# Patient Record
Sex: MALE | Race: WHITE | Employment: UNEMPLOYED | URBAN - METROPOLITAN AREA
[De-identification: names, ages, dates, MRNs, and addresses within clinical notes are randomized per-mention and may not be internally consistent; named-entity substitution may affect disease eponyms.]

---

## 2017-04-11 ENCOUNTER — OFFICE VISIT (OUTPATIENT)
Dept: NEUROLOGY | Age: 67
End: 2017-04-11

## 2017-04-11 VITALS
HEART RATE: 74 BPM | BODY MASS INDEX: 27.05 KG/M2 | HEIGHT: 62 IN | DIASTOLIC BLOOD PRESSURE: 80 MMHG | WEIGHT: 147 LBS | SYSTOLIC BLOOD PRESSURE: 139 MMHG

## 2017-04-11 DIAGNOSIS — G40.209 PARTIAL EPILEPSY WITH IMPAIRMENT OF CONSCIOUSNESS (HCC): ICD-10-CM

## 2017-04-11 PROCEDURE — 99213 OFFICE O/P EST LOW 20 MIN: CPT | Performed by: PSYCHIATRY & NEUROLOGY

## 2017-04-11 RX ORDER — PRIMIDONE 250 MG/1
TABLET ORAL
Qty: 60 TABLET | Refills: 5 | Status: SHIPPED | OUTPATIENT
Start: 2017-04-11 | End: 2017-10-03 | Stop reason: SDUPTHER

## 2017-10-03 ENCOUNTER — OFFICE VISIT (OUTPATIENT)
Dept: NEUROLOGY | Age: 67
End: 2017-10-03

## 2017-10-03 VITALS
DIASTOLIC BLOOD PRESSURE: 68 MMHG | HEIGHT: 62 IN | BODY MASS INDEX: 27.79 KG/M2 | HEART RATE: 62 BPM | SYSTOLIC BLOOD PRESSURE: 131 MMHG | WEIGHT: 151 LBS

## 2017-10-03 DIAGNOSIS — G40.209 PARTIAL EPILEPSY WITH IMPAIRMENT OF CONSCIOUSNESS (HCC): Primary | ICD-10-CM

## 2017-10-03 DIAGNOSIS — G47.33 OBSTRUCTIVE SLEEP APNEA: ICD-10-CM

## 2017-10-03 PROCEDURE — 99214 OFFICE O/P EST MOD 30 MIN: CPT | Performed by: PSYCHIATRY & NEUROLOGY

## 2017-10-03 RX ORDER — PRIMIDONE 250 MG/1
TABLET ORAL
Qty: 60 TABLET | Refills: 5 | Status: SHIPPED | OUTPATIENT
Start: 2017-10-03 | End: 2018-03-28 | Stop reason: SDUPTHER

## 2017-10-03 RX ORDER — CLOTRIMAZOLE AND BETAMETHASONE DIPROPIONATE 10; .64 MG/G; MG/G
CREAM TOPICAL
Refills: 4 | COMMUNITY
Start: 2017-08-07

## 2017-10-03 RX ORDER — FLUTICASONE PROPIONATE 50 MCG
SPRAY, SUSPENSION (ML) NASAL
Refills: 3 | COMMUNITY
Start: 2017-08-25

## 2017-10-03 RX ORDER — CLOPIDOGREL BISULFATE 75 MG/1
TABLET ORAL
Refills: 5 | COMMUNITY
Start: 2017-09-24

## 2017-10-03 RX ORDER — LEVOTHYROXINE SODIUM 0.05 MG/1
TABLET ORAL
Refills: 5 | COMMUNITY
Start: 2017-09-05 | End: 2017-10-03 | Stop reason: CLARIF

## 2017-10-03 NOTE — MR AVS SNAPSHOT
people who are more muscular. If your body fat is high you can improve your BMI by decreasing your calorie intake and becoming more physically active. Learn more at: [employer]          Instructions    Please call with any questions or concerns:   [facility] Neurology  @ [phone]. LAB RESULTS:  Please obtain any labs or diagnostic tests as discussed today. You may call the office to check the results. Please allow  3 to 7 days for us to get these results. MEDICATION LIST:  Please bring an accurate list of your medications to every visit. APPOINTMENT CONFIRMATION:  We will call you the day before your scheduled appointment to confirm. If we are unable to reach you, you MUST call back by the end of the day to confirm the appointment or we may be forced to cancel. Where to Get Your Medications      These medications were sent to [address], [address] [address]    Hours:  24-hours Phone:  [phone]     primidone 250 MG tablet         Your Current Medications Are              clopidogrel (PLAVIX) 75 MG tablet TK 1 T PO QD    fluticasone (FLONASE) 50 MCG/ACT nasal spray INSTILL 1 SPRAY IEN BID    clotrimazole-betamethasone (LOTRISONE) 1-0.05 % cream TE EXT AA BID    primidone (MYSOLINE) 250 MG tablet TAKE 1 TABLET BY MOUTH TWICE DAILY    atorvastatin (LIPITOR) 40 MG tablet     aspirin 81 MG tablet Take 81 mg by mouth daily    Simvastatin (ZOCOR PO) Take  by mouth. LEVOTHYROXINE SODIUM by Does not apply route. Omega-3-acid Ethyl Esters (LOVAZA PO) Take  by mouth.       Allergies           No Known Allergies      We Ordered/Performed the following           Viridiana Alcazarv     Scheduling Instructions:    [address]  3rd floor  [place], [address] [facility]  phone [phone] fax [phone]

## 2017-10-03 NOTE — PROGRESS NOTES
Boby Fritz   Neurology followup    Subjective:   CC/HP  History was obtained from patient  Patient has a chronic history for seizure disorder. Patient has not had a seizure in a number of years. No side effects of medication  New symptom:   Patient complains of significant daytime sleepiness   He feels tired all the time   Many times he does not feel rested in the morning   He has been told by family members that he snores at night   I'm concerned about possible obstructive sleep apnea syndrome   No other new neurological symptoms      REVIEW OF SYSTEMS    Constitutional:  []   Chills   []  Fatigue   []  Fevers   []  Malaise   []  Weight loss     [x] Denies all of the above    Respiratory:   []  Cough    []  Shortness of breath         [x] Denies all of the above     Cardiovascular:   []  Chest pain    []  Exertional chest pressure/discomfort           [] Palpitations    []  Syncope     [x] Denies all of the above        Past Medical History:   Diagnosis Date    Hyperlipidemia     Hypothyroidism     Seizures ([facility] [address]      History reviewed. No pertinent family history. Social History     Social History    Marital status: Single     Spouse name: N/A    Number of children: N/A    Years of education: N/A     Social History Main Topics    Smoking status: Never Smoker    Smokeless tobacco: Never Used    Alcohol use No    Drug use: No    Sexual activity: Not Asked     Other Topics Concern    None     Social History Narrative        Objective:  Exam:  /68  Pulse 62  Ht 5' 2\" (1.575 m)  Wt 151 lb (68.5 kg)  BMI 27.62 kg/m2  This is a well-nourished patient in no acute distress  Patient is awake, alert and oriented x3. Speech is normal.  Pupils are equal round reacting to light. Extraocular movements intact. Face symmetrical. Tongue midline. Motor exam shows normal symmetrical strength. Deep tendon reflexes normal. Plantar reflexes downgoing.   Sensory exam normal. Coordination normal. Gait normal. No carotid bruit. No neck stiffness. Impression :  Partial complex seizures, stable  Patient does not want to stop medication even though he has not had a seizure in a number of years  Probable obstructive sleep apnea syndrome but does snoring increased sleepiness and generalized fatigue    Plan :  Continue primidone 250 mg, one tablet b.i.d.  I will refer the patient for a sleep study              Please note a portion of  this chart was generated using dragon dictation software. Although every effort was made to ensure the accuracy of this automated transcription, some errors in transcription may have occurred.

## 2017-12-05 ENCOUNTER — INITIAL CONSULT (OUTPATIENT)
Dept: SLEEP MEDICINE | Age: 67
End: 2017-12-05

## 2017-12-05 VITALS
BODY MASS INDEX: 29.27 KG/M2 | HEIGHT: 61 IN | SYSTOLIC BLOOD PRESSURE: 154 MMHG | HEART RATE: 52 BPM | WEIGHT: 155 LBS | OXYGEN SATURATION: 100 % | DIASTOLIC BLOOD PRESSURE: 68 MMHG

## 2017-12-05 DIAGNOSIS — G40.209 PARTIAL EPILEPSY WITH IMPAIRMENT OF CONSCIOUSNESS (HCC): Chronic | ICD-10-CM

## 2017-12-05 DIAGNOSIS — E03.9 ACQUIRED HYPOTHYROIDISM: Chronic | ICD-10-CM

## 2017-12-05 DIAGNOSIS — R06.83 SNORING: ICD-10-CM

## 2017-12-05 DIAGNOSIS — G47.10 HYPERSOMNIA: Primary | ICD-10-CM

## 2017-12-05 PROCEDURE — 99204 OFFICE O/P NEW MOD 45 MIN: CPT | Performed by: INTERNAL MEDICINE

## 2017-12-05 ASSESSMENT — ENCOUNTER SYMPTOMS
EYE PAIN: 0
ABDOMINAL DISTENTION: 0
CHOKING: 0
SHORTNESS OF BREATH: 0
CHEST TIGHTNESS: 0
PHOTOPHOBIA: 0
NAUSEA: 0
RHINORRHEA: 0
ABDOMINAL PAIN: 0
ALLERGIC/IMMUNOLOGIC NEGATIVE: 1
VOMITING: 0
APNEA: 0

## 2017-12-05 ASSESSMENT — SLEEP AND FATIGUE QUESTIONNAIRES
NECK CIRCUMFERENCE (INCHES): 17
HOW LIKELY ARE YOU TO NOD OFF OR FALL ASLEEP WHILE LYING DOWN TO REST IN THE AFTERNOON WHEN CIRCUMSTANCES PERMIT: 1
HOW LIKELY ARE YOU TO NOD OFF OR FALL ASLEEP WHILE WATCHING TV: 0
HOW LIKELY ARE YOU TO NOD OFF OR FALL ASLEEP WHILE SITTING AND TALKING TO SOMEONE: 0
HOW LIKELY ARE YOU TO NOD OFF OR FALL ASLEEP IN A CAR, WHILE STOPPED FOR A FEW MINUTES IN TRAFFIC: 0
HOW LIKELY ARE YOU TO NOD OFF OR FALL ASLEEP WHEN YOU ARE A PASSENGER IN A CAR FOR AN HOUR WITHOUT A BREAK: 0
HOW LIKELY ARE YOU TO NOD OFF OR FALL ASLEEP WHILE SITTING QUIETLY AFTER LUNCH WITHOUT ALCOHOL: 0
HOW LIKELY ARE YOU TO NOD OFF OR FALL ASLEEP WHILE SITTING AND READING: 0
HOW LIKELY ARE YOU TO NOD OFF OR FALL ASLEEP WHILE SITTING INACTIVE IN A PUBLIC PLACE: 0
ESS TOTAL SCORE: 1

## 2017-12-05 NOTE — LETTER
[facility]  [address] [address]  Phone: [phone]  Fax: [phone]      December 5, 2017       Patient: Boby Fritz   MR Number: [id number]   YOB: 1950   Date of Visit: 12/5/2017     Thank you for allowing me to participate in the care of Boby Fritz. Here is my assessment and plan. Also attached is a copy of his consult note:    ASSESSMENT:  Yusra Cazares was seen today for sleep apnea. Diagnoses and all orders for this visit:    Hypersomnia  Comments:  New problem, needs w/u  Orders:  -     Baseline Diagnostic Sleep Study; Future    Snoring  Comments:  New problem, needs w/u  Orders:  -     Baseline Diagnostic Sleep Study; Future    Partial epilepsy with impairment of consciousness ([facility] [address]    Acquired hypothyroidism        Plan:     Differential diagnosis includes but not limited to: YANICK, PLMD's, narcolepsy, parasomnias. Reviewed YANICK (which is the highest likelihood diagnosis): pathophysiology, diagnosis, complications and treatment. Instructed him not to drive if drowsy. Continue medications per his PCP and other physicians. Limit caffeine use after 3pm. Will do PSG to rule-out YANICK and other sleep disorders. 1 wk follow up after study to review his results. The chronic medical conditions listed are directly related to the primary diagnosis listed above. The management of the primary diagnosis affects the secondary diagnosis and vice versa. Cont meds for SZ and hypothyroid. Given SZ pt is not a candidate for HST. Orders Placed This Encounter   Procedures    Baseline Diagnostic Sleep Study         If you have questions or concerns, please do not hesitate to call me. I look forward to following Yusra Cazares along with you.     Sincerely,      Fredo Mcbride MD    CC providers:  Mis Gonzalez MD  [address] [place] [address], [address] [place] [address] Shlomo Calderon 36 In MD Aman  [address] [place] [id number] VIA Facsimile: [phone]

## 2017-12-05 NOTE — PROGRESS NOTES
Sathish Jean Baptiste MD, FAA, [facility] HEART AND SURGICAL [facility]  [address] [facility]  3rd Floor, [address] [facility], [address] [phone]   [address] [place] [address] Ninfa Flanagan. [address] [phone]     [address] [place] [address] SLEEP MEDICINE    Subjective:     Patient ID: Natasha Vaughan is a 75 y.o. male. Chief Complaint   Patient presents with    Sleep Apnea       HPI:        Natasha Vaughan is a 75 y.o. male referred by Dr. Thais Bishop for a sleep evaluation. He complains of snoring, tossing and turning, decreased concentration, excessive daytime sleepiness, difficulty falling asleep once awakened, feels sleepy during the day but he denies knees buckling with laughing, completely or partially paralyzed while falling asleep or waking up. Symptoms began 5 years ago, gradually worsening since that time. Previous evaluation and treatment has included- none. DOT/CDL - No  FAA/'s license - No      Previous Report(s) Reviewed: historical medical records, office notes and referral letter/letters     [place] - Total score: 1    Caffeine Intake - 2 cans/bottles of caffeinated soda pop per day(s)    Social History     Social History    Marital status: Single     Spouse name: N/A    Number of children: N/A    Years of education: N/A     Occupational History    Not on file. Social History Main Topics    Smoking status: Never Smoker    Smokeless tobacco: Never Used    Alcohol use No    Drug use: No    Sexual activity: Not on file     Other Topics Concern    Not on file     Social History Narrative    No narrative on file       Prior to Admission medications    Medication Sig Start Date End Date Taking?  Authorizing Provider   Multiple Vitamin (MULTI-VITAMIN DAILY PO) Take by mouth   Yes Historical Provider, MD   clopidogrel (PLAVIX) 75 MG tablet TK 1 T PO QD 9/24/17  Yes Historical Provider, MD   fluticasone (FLONASE) 50 MCG/ACT nasal spray INSTILL 1 SPRAY IEN BID 8/25/17  Yes Historical Provider, MD   clotrimazole-betamethasone (Diandra Oddi) 1-0.05 % cream TE EXT AA BID 8/7/17  Yes Historical Provider, MD   primidone (MYSOLINE) 250 MG tablet TAKE 1 TABLET BY MOUTH TWICE DAILY 10/3/17  Yes Maria Elena Arriaga MD   atorvastatin (LIPITOR) 40 MG tablet  10/26/15  Yes Historical Provider, MD   aspirin 81 MG tablet Take 81 mg by mouth daily   Yes Historical Provider, MD   LEVOTHYROXINE SODIUM by Does not apply route. Yes Historical Provider, MD   Omega-3-acid Ethyl Esters (LOVAZA PO) Take  by mouth. Yes Historical Provider, MD   Simvastatin (ZOCOR PO) Take  by mouth. Historical Provider, MD       Allergies as of 12/05/2017    (No Known Allergies)       Patient Active Problem List   Diagnosis    Partial epilepsy with impairment of consciousness ([facility] [address]    Acquired hypothyroidism       Past Medical History:   Diagnosis Date    Hyperlipidemia     Hypothyroidism     Seizures ([facility] [address]        History reviewed. No pertinent surgical history. History reviewed. No pertinent family history. Review of Systems   Constitutional: Positive for fatigue. Negative for activity change and appetite change. HENT: Positive for congestion. Negative for nosebleeds, postnasal drip, rhinorrhea and sneezing. Eyes: Negative for photophobia, pain and visual disturbance. Respiratory: Negative for apnea, choking, chest tightness and shortness of breath. Cardiovascular: Negative. Gastrointestinal: Negative for abdominal distention, abdominal pain, nausea and vomiting. Endocrine: Negative for cold intolerance and heat intolerance. Genitourinary: Negative for difficulty urinating, dysuria, frequency and urgency. Musculoskeletal: Negative. Negative for neck pain and neck stiffness. Skin: Negative. Allergic/Immunologic: Negative. Neurological: Positive for seizures. Negative for tremors, syncope and weakness. Hematological: Negative for adenopathy.  Does not side): No submental, no submandibular and no tonsillar adenopathy present. Head (left side): No submental, no submandibular and no tonsillar adenopathy present. Neurological: He is alert and oriented to person, place, and time. Skin: Skin is warm, dry and intact. No cyanosis. Nails show no clubbing. Psychiatric: He has a normal mood and affect. His speech is normal and behavior is normal. Thought content normal.   Nursing note and vitals reviewed. Assessment:      1. Hypersomnia  Baseline Diagnostic Sleep Study    New problem, needs w/u   2. Snoring  Baseline Diagnostic Sleep Study    New problem, needs w/u   3. Partial epilepsy with impairment of consciousness (HCC) Stable    4. Acquired hypothyroidism Stable         Plan:      Differential diagnosis includes but not limited to: YANICK, PLMD's, narcolepsy, parasomnias. Reviewed YANICK (which is the highest likelihood diagnosis): pathophysiology, diagnosis, complications and treatment. Instructed him not to drive if drowsy. Continue medications per his PCP and other physicians. Limit caffeine use after 3pm. Will do PSG to rule-out YANICK and other sleep disorders. 1 wk follow up after study to review his results. The chronic medical conditions listed are directly related to the primary diagnosis listed above. The management of the primary diagnosis affects the secondary diagnosis and vice versa. Cont meds for SZ and hypothyroid. Given SZ pt is not a candidate for HST.     Orders Placed This Encounter   Procedures    Baseline Diagnostic Sleep Study          Aggie Hargrove MD, Candice Bermudez, [address] [facility] and [address] [facility]

## 2018-01-21 ENCOUNTER — HOSPITAL ENCOUNTER (OUTPATIENT)
Dept: SLEEP MEDICINE | Age: 68
Discharge: HOME | End: 2018-01-23
Attending: INTERNAL MEDICINE | Admitting: INTERNAL MEDICINE

## 2018-01-21 DIAGNOSIS — G47.10 HYPERSOMNIA: ICD-10-CM

## 2018-01-21 DIAGNOSIS — R06.83 SNORING: ICD-10-CM

## 2018-01-21 PROCEDURE — 95810 POLYSOM 6/> YRS 4/> PARAM: CPT | Performed by: INTERNAL MEDICINE

## 2018-01-29 ENCOUNTER — TELEPHONE (OUTPATIENT)
Dept: SLEEP MEDICINE | Age: 68
End: 2018-01-29

## 2018-01-29 NOTE — TELEPHONE ENCOUNTER
Spoke with pt to review PSG. Pt appeared to understand. Eveline Pastor from Sleep lab to call to  schedule study 01/30/2017. Pt does not want a Sunday night study due to Yazdanism and laundry day on Monday. Pt to be called with results from titration.

## 2018-03-19 DIAGNOSIS — G47.33 OBSTRUCTIVE SLEEP APNEA SYNDROME: Primary | ICD-10-CM

## 2018-03-20 ENCOUNTER — HOSPITAL ENCOUNTER (OUTPATIENT)
Dept: SLEEP MEDICINE | Age: 68
Discharge: HOME | End: 2018-03-22
Attending: INTERNAL MEDICINE | Admitting: INTERNAL MEDICINE

## 2018-03-20 DIAGNOSIS — G47.33 OBSTRUCTIVE SLEEP APNEA SYNDROME: ICD-10-CM

## 2018-03-20 PROCEDURE — 95811 POLYSOM 6/>YRS CPAP 4/> PARM: CPT | Performed by: INTERNAL MEDICINE

## 2018-03-26 ENCOUNTER — TELEPHONE (OUTPATIENT)
Dept: SLEEP MEDICINE | Age: 68
End: 2018-03-26

## 2018-03-26 NOTE — TELEPHONE ENCOUNTER
Tried to call pt to advise him that I faxed his order for CPAP to [facility] today, but got a busy signal. Pt needs a 8-10 weeks cnfu scheduled

## 2018-03-28 ENCOUNTER — OFFICE VISIT (OUTPATIENT)
Dept: NEUROLOGY | Age: 68
End: 2018-03-28

## 2018-03-28 ENCOUNTER — TELEPHONE (OUTPATIENT)
Dept: SLEEP MEDICINE | Age: 68
End: 2018-03-28

## 2018-03-28 VITALS
HEART RATE: 68 BPM | WEIGHT: 158 LBS | SYSTOLIC BLOOD PRESSURE: 135 MMHG | BODY MASS INDEX: 29.08 KG/M2 | DIASTOLIC BLOOD PRESSURE: 74 MMHG | HEIGHT: 62 IN

## 2018-03-28 DIAGNOSIS — G40.209 PARTIAL EPILEPSY WITH IMPAIRMENT OF CONSCIOUSNESS (HCC): Primary | ICD-10-CM

## 2018-03-28 DIAGNOSIS — G47.33 OBSTRUCTIVE SLEEP APNEA: ICD-10-CM

## 2018-03-28 PROCEDURE — 99213 OFFICE O/P EST LOW 20 MIN: CPT | Performed by: PSYCHIATRY & NEUROLOGY

## 2018-03-28 RX ORDER — PRIMIDONE 250 MG/1
TABLET ORAL
Qty: 60 TABLET | Refills: 5 | Status: SHIPPED | OUTPATIENT
Start: 2018-03-28 | End: 2018-09-25 | Stop reason: SDUPTHER

## 2018-03-28 NOTE — PROGRESS NOTES
carotid bruit. No neck stiffness. Impression :  Partial complex seizures, stable  Patient does not want to stop medication even though he has not had a seizure in a number of years  Obstructive sleep apnea syndrome, patient needs to start using CPAP    Plan :  Continue primidone 250 mg, one tablet b.i.d. Patient will check with the sleep clinic to get his CPAP machine              Please note a portion of  this chart was generated using dragon dictation software. Although every effort was made to ensure the accuracy of this automated transcription, some errors in transcription may have occurred.

## 2018-04-02 ENCOUNTER — TELEPHONE (OUTPATIENT)
Dept: SLEEP MEDICINE | Age: 68
End: 2018-04-02

## 2018-09-25 ENCOUNTER — OFFICE VISIT (OUTPATIENT)
Dept: NEUROLOGY | Age: 68
End: 2018-09-25
Payer: MEDICARE

## 2018-09-25 VITALS
DIASTOLIC BLOOD PRESSURE: 70 MMHG | HEIGHT: 62 IN | HEART RATE: 68 BPM | WEIGHT: 163 LBS | SYSTOLIC BLOOD PRESSURE: 134 MMHG | BODY MASS INDEX: 30 KG/M2

## 2018-09-25 DIAGNOSIS — G40.209 PARTIAL EPILEPSY WITH IMPAIRMENT OF CONSCIOUSNESS (HCC): ICD-10-CM

## 2018-09-25 PROCEDURE — 99213 OFFICE O/P EST LOW 20 MIN: CPT | Performed by: PSYCHIATRY & NEUROLOGY

## 2018-09-25 RX ORDER — CYCLOBENZAPRINE HCL 5 MG
5 TABLET ORAL 3 TIMES DAILY PRN
COMMUNITY

## 2018-09-25 RX ORDER — PRIMIDONE 250 MG/1
TABLET ORAL
Qty: 60 TABLET | Refills: 5 | Status: SHIPPED | OUTPATIENT
Start: 2018-09-25 | End: 2019-02-10 | Stop reason: SDUPTHER

## 2018-09-25 NOTE — PROGRESS NOTES
[facility]   Neurology followup    Subjective:   CC/HP  History was obtained from patient  Patient had a sleep study showed severe obstructive sleep apnea syndrome. Patient has obstructive sleep apnea syndrome and is now been using a CPAP machine regularly. Patient has a chronic history for seizure disorder. Patient has not had a seizure in a number of years. No side effects of medication  No other new neurological symptoms      REVIEW OF SYSTEMS    Constitutional:  []   Chills   []  Fatigue   []  Fevers   []  Malaise   []  Weight loss     [x] Denies all of the above    Respiratory:   []  Cough    []  Shortness of breath         [x] Denies all of the above     Cardiovascular:   []  Chest pain    []  Exertional chest pressure/discomfort           [] Palpitations    []  Syncope     [x] Denies all of the above        Past Medical History:   Diagnosis Date    Hyperlipidemia     Hypothyroidism     Seizures ([facility] [address]      Family History   Problem Relation Age of Onset    Diabetes Father     High Blood Pressure Father     Other Father         YANICK    Stroke Father      Social History     Social History    Marital status: Single     Spouse name: N/A    Number of children: N/A    Years of education: N/A     Social History Main Topics    Smoking status: Never Smoker    Smokeless tobacco: Never Used    Alcohol use No    Drug use: No    Sexual activity: Not Asked     Other Topics Concern    None     Social History Narrative    None        Objective:  Exam:  /70   Pulse 68   Ht 5' 2\" (1.575 m)   Wt 163 lb (73.9 kg)   BMI 29.81 kg/m²   This is a well-nourished patient in no acute distress  Patient is awake, alert and oriented x3. Speech is normal.  Pupils are equal round reacting to light. Extraocular movements intact. Face symmetrical. Tongue midline. Motor exam shows normal symmetrical strength. Deep tendon reflexes normal. Plantar reflexes downgoing.   Sensory exam normal. Coordination normal. Gait normal. No carotid bruit. No neck stiffness. Impression :  Partial complex seizures, stable  Patient does not want to stop medication even though he has not had a seizure in a number of years  Obstructive sleep apnea syndrome, on CPAP    Plan :  Continue primidone 250 mg, one tablet b.i.d. Continue using CPAP on a regular basis. Please note a portion of  this chart was generated using dragon dictation software. Although every effort was made to ensure the accuracy of this automated transcription, some errors in transcription may have occurred.

## 2018-09-25 NOTE — PATIENT INSTRUCTIONS
Please call with any questions or concerns:   [facility]A [facility] Neurology  @ [phone]. LAB RESULTS:  Please obtain any labs or diagnostic tests as discussed today. You should call the office to check the results. Please allow  3 to 7 days for us to get these results. MEDICATION LIST:  Please bring an accurate list of your medications to every visit. APPOINTMENT CONFIRMATION:  We will call you the day before your scheduled appointment to confirm. If we are unable to reach you, you MUST call back by the end of the day to confirm the appointment or we may be forced to cancel.

## 2019-03-15 DIAGNOSIS — G40.209 PARTIAL EPILEPSY WITH IMPAIRMENT OF CONSCIOUSNESS (HCC): ICD-10-CM

## 2019-03-15 RX ORDER — PRIMIDONE 250 MG/1
TABLET ORAL
Qty: 60 TABLET | Refills: 0 | Status: SHIPPED | OUTPATIENT
Start: 2019-03-15 | End: 2019-04-01 | Stop reason: SDUPTHER

## 2019-04-01 DIAGNOSIS — G40.209 PARTIAL EPILEPSY WITH IMPAIRMENT OF CONSCIOUSNESS (HCC): ICD-10-CM

## 2019-04-01 RX ORDER — PRIMIDONE 250 MG/1
TABLET ORAL
Qty: 60 TABLET | Refills: 0 | Status: SHIPPED | OUTPATIENT
Start: 2019-04-01 | End: 2019-04-02 | Stop reason: SDUPTHER

## 2019-04-02 DIAGNOSIS — G40.209 PARTIAL EPILEPSY WITH IMPAIRMENT OF CONSCIOUSNESS (HCC): ICD-10-CM

## 2019-04-02 RX ORDER — PRIMIDONE 250 MG/1
TABLET ORAL
Qty: 60 TABLET | Refills: 0 | Status: SHIPPED | OUTPATIENT
Start: 2019-04-02 | End: 2019-05-31 | Stop reason: SDUPTHER

## 2019-05-31 ENCOUNTER — OFFICE VISIT (OUTPATIENT)
Dept: NEUROLOGY | Age: 69
End: 2019-05-31
Payer: MEDICARE

## 2019-05-31 VITALS
SYSTOLIC BLOOD PRESSURE: 128 MMHG | HEIGHT: 62 IN | WEIGHT: 158 LBS | DIASTOLIC BLOOD PRESSURE: 88 MMHG | HEART RATE: 80 BPM | BODY MASS INDEX: 29.08 KG/M2

## 2019-05-31 DIAGNOSIS — G47.33 OBSTRUCTIVE SLEEP APNEA: ICD-10-CM

## 2019-05-31 DIAGNOSIS — G40.209 PARTIAL EPILEPSY WITH IMPAIRMENT OF CONSCIOUSNESS (HCC): Primary | ICD-10-CM

## 2019-05-31 PROCEDURE — 99213 OFFICE O/P EST LOW 20 MIN: CPT | Performed by: PSYCHIATRY & NEUROLOGY

## 2019-05-31 RX ORDER — ATORVASTATIN CALCIUM 80 MG/1
80 TABLET, FILM COATED ORAL DAILY
COMMUNITY

## 2019-05-31 RX ORDER — AMLODIPINE BESYLATE 5 MG/1
10 TABLET ORAL DAILY
COMMUNITY

## 2019-05-31 RX ORDER — PRIMIDONE 250 MG/1
TABLET ORAL
Qty: 60 TABLET | Refills: 5 | Status: SHIPPED | OUTPATIENT
Start: 2019-05-31 | End: 2019-12-13 | Stop reason: SDUPTHER

## 2019-05-31 NOTE — PROGRESS NOTES
Carissa Vinson   Neurology followup    Subjective:   CC/HP  History was obtained from patient  Patient had a sleep study showed severe obstructive sleep apnea syndrome. Patient has obstructive sleep apnea syndrome he has had some difficulty with using the CPAP. Patient has a chronic history for seizure disorder. Patient has not had a seizure in a number of years.   No side effects of medication  No other new neurological symptoms      REVIEW OF SYSTEMS    Constitutional:  []   Chills   []  Fatigue   []  Fevers   []  Malaise   []  Weight loss     [x] Denies all of the above    Respiratory:   []  Cough    []  Shortness of breath         [x] Denies all of the above     Cardiovascular:   []  Chest pain    []  Exertional chest pressure/discomfort           [] Palpitations    []  Syncope     [x] Denies all of the above        Past Medical History:   Diagnosis Date    Hyperlipidemia     Hypothyroidism     Seizures ([facility] [address]      Family History   Problem Relation Age of Onset    Diabetes Father     High Blood Pressure Father     Other Father         YANICK    Stroke Father      Social History     Socioeconomic History    Marital status: Single     Spouse name: None    Number of children: None    Years of education: None    Highest education level: None   Occupational History    None   Social Needs    Financial resource strain: None    Food insecurity:     Worry: None     Inability: None    Transportation needs:     Medical: None     Non-medical: None   Tobacco Use    Smoking status: Never Smoker    Smokeless tobacco: Never Used   Substance and Sexual Activity    Alcohol use: No    Drug use: No    Sexual activity: None   Lifestyle    Physical activity:     Days per week: None     Minutes per session: None    Stress: None   Relationships    Social connections:     Talks on phone: None     Gets together: None     Attends Confucianism service: None     Active member of club or organization: None     Attends

## 2019-12-13 ENCOUNTER — OFFICE VISIT (OUTPATIENT)
Dept: NEUROLOGY | Age: 69
End: 2019-12-13
Payer: MEDICARE

## 2019-12-13 VITALS
WEIGHT: 151 LBS | BODY MASS INDEX: 27.62 KG/M2 | HEART RATE: 56 BPM | SYSTOLIC BLOOD PRESSURE: 126 MMHG | DIASTOLIC BLOOD PRESSURE: 55 MMHG

## 2019-12-13 DIAGNOSIS — G47.33 OBSTRUCTIVE SLEEP APNEA: ICD-10-CM

## 2019-12-13 DIAGNOSIS — G40.209 PARTIAL EPILEPSY WITH IMPAIRMENT OF CONSCIOUSNESS (HCC): Primary | ICD-10-CM

## 2019-12-13 PROCEDURE — 99213 OFFICE O/P EST LOW 20 MIN: CPT | Performed by: PSYCHIATRY & NEUROLOGY

## 2019-12-13 RX ORDER — PRIMIDONE 250 MG/1
TABLET ORAL
Qty: 60 TABLET | Refills: 5 | Status: SHIPPED | OUTPATIENT
Start: 2019-12-13 | End: 2020-06-02

## 2019-12-13 RX ORDER — LOSARTAN POTASSIUM 100 MG/1
100 TABLET ORAL DAILY
COMMUNITY
End: 2021-12-29

## 2020-07-02 ENCOUNTER — OFFICE VISIT (OUTPATIENT)
Dept: NEUROLOGY | Age: 70
End: 2020-07-02
Payer: MEDICARE

## 2020-07-02 VITALS
HEART RATE: 51 BPM | WEIGHT: 150 LBS | HEIGHT: 62 IN | DIASTOLIC BLOOD PRESSURE: 66 MMHG | BODY MASS INDEX: 27.6 KG/M2 | SYSTOLIC BLOOD PRESSURE: 132 MMHG

## 2020-07-02 PROCEDURE — 99213 OFFICE O/P EST LOW 20 MIN: CPT | Performed by: PSYCHIATRY & NEUROLOGY

## 2020-07-02 RX ORDER — PRIMIDONE 250 MG/1
TABLET ORAL
Qty: 180 TABLET | Refills: 1 | Status: SHIPPED | OUTPATIENT
Start: 2020-07-02 | End: 2020-12-29 | Stop reason: SDUPTHER

## 2020-07-02 NOTE — PATIENT INSTRUCTIONS
Please call with any questions or concerns:   SS[facility]IOLETTA [facility] Neurology  @ [phone]. LAB RESULTS:  Please obtain any labs or diagnostic tests as discussed today. You should call the office to check the results. Please allow  3 to 7 days for us to get these results. MEDICATION LIST:  Please bring an accurate list of your medications to every visit. APPOINTMENT CONFIRMATION:  We will call you the day before your scheduled appointment to confirm. If we are unable to reach you, you MUST call back by the end of the day to confirm the appointment or we may be forced to cancel.

## 2020-07-02 NOTE — PROGRESS NOTES
[facility]   Neurology followup    Subjective:   CC/HP  History was obtained from patient  Patient has severe obstructive sleep apnea syndrome. Patient states that he is having some difficulty with the mask and uses a CPAP machine only on and off. Patient has a chronic history for seizure disorder. Patient has not had a seizure in a number of years.   No side effects of medication  No other new neurological symptoms      REVIEW OF SYSTEMS    Constitutional:  []   Chills   [x]  Fatigue   []  Fevers   []  Malaise   []  Weight loss     [] Denies all of the above    Respiratory:   []  Cough    []  Shortness of breath         [x] Denies all of the above     Cardiovascular:   []  Chest pain    []  Exertional chest pressure/discomfort           [] Palpitations    []  Syncope     [x] Denies all of the above        Past Medical History:   Diagnosis Date    Hyperlipidemia     Hypothyroidism     Seizures ([facility] [address]      Family History   Problem Relation Age of Onset    Diabetes Father     High Blood Pressure Father     Other Father         YANICK    Stroke Father      Social History     Socioeconomic History    Marital status: Single     Spouse name: Not on file    Number of children: Not on file    Years of education: Not on file    Highest education level: Not on file   Occupational History    Not on file   Social Needs    Financial resource strain: Not on file    Food insecurity     Worry: Not on file     Inability: Not on file    Transportation needs     Medical: Not on file     Non-medical: Not on file   Tobacco Use    Smoking status: Never Smoker    Smokeless tobacco: Never Used   Substance and Sexual Activity    Alcohol use: No    Drug use: No    Sexual activity: Not on file   Lifestyle    Physical activity     Days per week: Not on file     Minutes per session: Not on file    Stress: Not on file   Relationships    Social connections     Talks on phone: Not on file     Gets together: Not on file Attends Mormon service: Not on file     Active member of club or organization: Not on file     Attends meetings of clubs or organizations: Not on file     Relationship status: Not on file    Intimate partner violence     Fear of current or ex partner: Not on file     Emotionally abused: Not on file     Physically abused: Not on file     Forced sexual activity: Not on file   Other Topics Concern    Not on file   Social History Narrative    Not on file        Objective:  Exam:  Ht 5' 2\" (1.575 m)   Wt 150 lb (68 kg)   BMI 27.44 kg/m²   This is a well-nourished patient in no acute distress  Patient is awake, alert and oriented x3. Speech is normal.  Pupils are equal round reacting to light. Extraocular movements intact. Face symmetrical. Tongue midline. Motor exam shows normal symmetrical strength. Deep tendon reflexes normal. Plantar reflexes downgoing. Sensory exam normal. Coordination normal. Gait normal. No carotid bruit. No neck stiffness. Impression :  Partial complex seizures, doing very well. I have talked with the patient about stopping seizure medications and he has not had it for a long time. Patient does not want to stop medication even though he has not had a seizure in a number of years  Obstructive sleep apnea syndrome, on CPAP    Plan :  Continue primidone 250 mg, one tablet b.i.d. Recommended using CPAP on a regular basis. Please note a portion of  this chart was generated using dragon dictation software. Although every effort was made to ensure the accuracy of this automated transcription, some errors in transcription may have occurred.

## 2020-08-11 ENCOUNTER — TELEPHONE (OUTPATIENT)
Dept: NEUROLOGY | Age: 70
End: 2020-08-11

## 2020-08-11 NOTE — TELEPHONE ENCOUNTER
Yoselin Allen from [address] states that she just spoke with patient and he has not been using his sleep pap machine because he has not been able to clean it. She has given him resources for this in the past. His last sleep study was 2 years ago. She is concerned because of his severe obstruction. She just wanted to make the providers aware of this. [id number] [place] phone call.

## 2020-12-29 ENCOUNTER — OFFICE VISIT (OUTPATIENT)
Dept: NEUROLOGY | Age: 70
End: 2020-12-29
Payer: MEDICARE

## 2020-12-29 VITALS
DIASTOLIC BLOOD PRESSURE: 66 MMHG | RESPIRATION RATE: 14 BRPM | HEART RATE: 51 BPM | TEMPERATURE: 97 F | SYSTOLIC BLOOD PRESSURE: 130 MMHG | HEIGHT: 62 IN | WEIGHT: 153 LBS | BODY MASS INDEX: 28.16 KG/M2

## 2020-12-29 PROCEDURE — 99213 OFFICE O/P EST LOW 20 MIN: CPT | Performed by: PSYCHIATRY & NEUROLOGY

## 2020-12-29 RX ORDER — PRIMIDONE 250 MG/1
TABLET ORAL
Qty: 180 TABLET | Refills: 1 | Status: SHIPPED | OUTPATIENT
Start: 2020-12-29 | End: 2021-06-29 | Stop reason: SDUPTHER

## 2020-12-29 NOTE — PROGRESS NOTES
[facility]   Neurology followup    Subjective:   CC/HP  History was obtained from patient  Patient has a chronic history of seizure disorder. Patient has not had a seizure in a number of years. No side effects of medication  No other new neurological symptoms  Patient has severe obstructive sleep apnea syndrome. Patient states that he is having some difficulty with the mask and uses a CPAP machine only on and off.       REVIEW OF SYSTEMS    Constitutional:  []   Chills   [x]  Fatigue   []  Fevers   []  Malaise   []  Weight loss     [] Denies all of the above    Respiratory:   []  Cough    []  Shortness of breath         [x] Denies all of the above     Cardiovascular:   []  Chest pain    []  Exertional chest pressure/discomfort           [] Palpitations    []  Syncope     [x] Denies all of the above        Past Medical History:   Diagnosis Date    Hyperlipidemia     Hypothyroidism     Seizures ([facility] [address]      Family History   Problem Relation Age of Onset    Diabetes Father     High Blood Pressure Father     Other Father         YANICK    Stroke Father      Social History     Socioeconomic History    Marital status: Single     Spouse name: Not on file    Number of children: Not on file    Years of education: Not on file    Highest education level: Not on file   Occupational History    Not on file   Social Needs    Financial resource strain: Not on file    Food insecurity     Worry: Not on file     Inability: Not on file    Transportation needs     Medical: Not on file     Non-medical: Not on file   Tobacco Use    Smoking status: Never Smoker    Smokeless tobacco: Never Used   Substance and Sexual Activity    Alcohol use: No    Drug use: No    Sexual activity: Not on file   Lifestyle    Physical activity     Days per week: Not on file     Minutes per session: Not on file    Stress: Not on file   Relationships    Social connections     Talks on phone: Not on file     Gets together: Not on file Attends [employer] service: Not on file     Active member of club or organization: Not on file     Attends meetings of clubs or organizations: Not on file     Relationship status: Not on file    Intimate partner violence     Fear of current or ex partner: Not on file     Emotionally abused: Not on file     Physically abused: Not on file     Forced sexual activity: Not on file   Other Topics Concern    Not on file   Social History Narrative    Not on file        Objective:  Exam:  There were no vitals taken for this visit. This is a well-nourished patient in no acute distress  Patient is awake, alert and oriented x3. Speech is normal.  Pupils are equal round reacting to light. Extraocular movements intact. Face symmetrical. Tongue midline. Motor exam shows normal symmetrical strength. Deep tendon reflexes normal. Plantar reflexes downgoing. Sensory exam normal. Coordination normal. Gait normal. No carotid bruit. No neck stiffness. Impression :  Partial complex seizures, doing very well. I have talked with the patient about stopping seizure medications and he has not had it for a long time. Patient does not want to stop medication even though he has not had a seizure in a number of years  Obstructive sleep apnea syndrome, on CPAP    Plan :  Continue primidone 250 mg, one tablet b.i.d. Recommended using CPAP on a regular basis. Please note a portion of  this chart was generated using dragon dictation software. Although every effort was made to ensure the accuracy of this automated transcription, some errors in transcription may have occurred.

## 2021-06-29 ENCOUNTER — OFFICE VISIT (OUTPATIENT)
Dept: NEUROLOGY | Age: 71
End: 2021-06-29
Payer: COMMERCIAL

## 2021-06-29 VITALS
BODY MASS INDEX: 30.04 KG/M2 | WEIGHT: 153 LBS | HEIGHT: 60 IN | HEART RATE: 49 BPM | SYSTOLIC BLOOD PRESSURE: 110 MMHG | DIASTOLIC BLOOD PRESSURE: 51 MMHG

## 2021-06-29 DIAGNOSIS — G47.33 OBSTRUCTIVE SLEEP APNEA: ICD-10-CM

## 2021-06-29 DIAGNOSIS — G40.209 PARTIAL EPILEPSY WITH IMPAIRMENT OF CONSCIOUSNESS (HCC): Primary | ICD-10-CM

## 2021-06-29 PROCEDURE — 99213 OFFICE O/P EST LOW 20 MIN: CPT | Performed by: PSYCHIATRY & NEUROLOGY

## 2021-06-29 RX ORDER — PRIMIDONE 250 MG/1
TABLET ORAL
Qty: 180 TABLET | Refills: 1 | Status: SHIPPED | OUTPATIENT
Start: 2021-06-29 | End: 2021-12-29 | Stop reason: SDUPTHER

## 2021-06-29 NOTE — PROGRESS NOTES
D[facility]   Neurology followup    Subjective:   CC/HP  History was obtained from patient  Patient has a chronic history of seizure disorder. Patient has not had a seizure in a number of years. No side effects of medication  No other new neurological symptoms  Patient has severe obstructive sleep apnea syndrome. Patient states that he is having some difficulty with the mask and uses a CPAP machine only on and off. REVIEW OF SYSTEMS    Constitutional:  []   Chills   [x]  Fatigue   []  Fevers   []  Malaise   []  Weight loss     [] Denies all of the above    Respiratory:   []  Cough    []  Shortness of breath         [x] Denies all of the above     Cardiovascular:   []  Chest pain    []  Exertional chest pressure/discomfort           [] Palpitations    []  Syncope     [x] Denies all of the above        Past Medical History:   Diagnosis Date    Hyperlipidemia     Hypothyroidism     Seizures ([facility] [address]      Family History   Problem Relation Age of Onset    Diabetes Father     High Blood Pressure Father     Other Father         YANICK    Stroke Father      Social History     Socioeconomic History    Marital status: Single     Spouse name: None    Number of children: None    Years of education: None    Highest education level: None   Occupational History    None   Tobacco Use    Smoking status: Never Smoker    Smokeless tobacco: Never Used   Vaping Use    Vaping Use: Never used   Substance and Sexual Activity    Alcohol use: No    Drug use: No    Sexual activity: None   Other Topics Concern    None   Social History Narrative    None     Social Determinants of Health     Financial Resource Strain:     Difficulty of Paying Living Expenses:    Food Insecurity:     Worried About Running Out of Food in the Last Year:     Ran Out of Food in the Last Year:    Transportation Needs:     Lack of Transportation (Medical):      Lack of Transportation (Non-Medical):    Physical Activity:     Days of Exercise per Week:     Minutes of Exercise per Session:    Stress:     Feeling of Stress :    Social Connections:     Frequency of Communication with Friends and Family:     Frequency of Social Gatherings with Friends and Family:     Attends [employer] Services:     Active Member of Clubs or Organizations:     Attends Club or Organization Meetings:     Marital Status:    Intimate Partner Violence:     Fear of Current or Ex-Partner:     Emotionally Abused:     Physically Abused:     Sexually Abused:         Objective:  Exam:  Ht 5' (1.524 m)   Wt 153 lb (69.4 kg)   BMI 29.88 kg/m²   This is a well-nourished patient in no acute distress  Patient is awake, alert and oriented x3. Speech is normal.  Pupils are equal round reacting to light. Extraocular movements intact. Face symmetrical. Tongue midline. Motor exam shows normal symmetrical strength. Deep tendon reflexes normal. Plantar reflexes downgoing. Sensory exam normal. Coordination normal. Gait normal. No carotid bruit. No neck stiffness. Impression :  Partial complex seizures, doing very well. I have talked with the patient about stopping seizure medications and he has not had it for a long time. Patient does not want to stop medication even though he has not had a seizure in a number of years  Obstructive sleep apnea syndrome, on CPAP    Plan :  Continue primidone 250 mg, one tablet b.i.d. Recommended using CPAP on a regular basis. Please note a portion of  this chart was generated using dragon dictation software. Although every effort was made to ensure the accuracy of this automated transcription, some errors in transcription may have occurred.  110 51

## 2021-12-29 ENCOUNTER — OFFICE VISIT (OUTPATIENT)
Dept: CARDIOLOGY CLINIC | Age: 71
End: 2021-12-29
Payer: COMMERCIAL

## 2021-12-29 ENCOUNTER — OFFICE VISIT (OUTPATIENT)
Dept: NEUROLOGY | Age: 71
End: 2021-12-29
Payer: COMMERCIAL

## 2021-12-29 VITALS
HEART RATE: 59 BPM | SYSTOLIC BLOOD PRESSURE: 130 MMHG | HEIGHT: 62 IN | WEIGHT: 159.9 LBS | BODY MASS INDEX: 29.43 KG/M2 | OXYGEN SATURATION: 98 % | DIASTOLIC BLOOD PRESSURE: 70 MMHG

## 2021-12-29 VITALS
WEIGHT: 160 LBS | BODY MASS INDEX: 29.44 KG/M2 | DIASTOLIC BLOOD PRESSURE: 68 MMHG | HEIGHT: 62 IN | HEART RATE: 50 BPM | SYSTOLIC BLOOD PRESSURE: 138 MMHG

## 2021-12-29 DIAGNOSIS — R94.31 ABNORMAL EKG: ICD-10-CM

## 2021-12-29 DIAGNOSIS — I63.9 CEREBROVASCULAR ACCIDENT (CVA), UNSPECIFIED MECHANISM (HCC): ICD-10-CM

## 2021-12-29 DIAGNOSIS — D64.9 ANEMIA, UNSPECIFIED TYPE: ICD-10-CM

## 2021-12-29 DIAGNOSIS — R56.9 SEIZURE (HCC): ICD-10-CM

## 2021-12-29 DIAGNOSIS — N28.9 RENAL INSUFFICIENCY: ICD-10-CM

## 2021-12-29 DIAGNOSIS — E07.9 THYROID DISORDER: ICD-10-CM

## 2021-12-29 DIAGNOSIS — E78.2 MIXED HYPERLIPIDEMIA: ICD-10-CM

## 2021-12-29 DIAGNOSIS — G40.209 PARTIAL EPILEPSY WITH IMPAIRMENT OF CONSCIOUSNESS (HCC): Primary | ICD-10-CM

## 2021-12-29 DIAGNOSIS — G47.33 OBSTRUCTIVE SLEEP APNEA SYNDROME: ICD-10-CM

## 2021-12-29 DIAGNOSIS — I10 ESSENTIAL HYPERTENSION: Primary | ICD-10-CM

## 2021-12-29 DIAGNOSIS — G47.33 OBSTRUCTIVE SLEEP APNEA: ICD-10-CM

## 2021-12-29 PROCEDURE — 99213 OFFICE O/P EST LOW 20 MIN: CPT | Performed by: PSYCHIATRY & NEUROLOGY

## 2021-12-29 PROCEDURE — 93000 ELECTROCARDIOGRAM COMPLETE: CPT | Performed by: INTERNAL MEDICINE

## 2021-12-29 PROCEDURE — 99204 OFFICE O/P NEW MOD 45 MIN: CPT | Performed by: INTERNAL MEDICINE

## 2021-12-29 RX ORDER — TERBINAFINE HYDROCHLORIDE 250 MG/1
TABLET ORAL DAILY
COMMUNITY
Start: 2021-12-21

## 2021-12-29 RX ORDER — PRIMIDONE 250 MG/1
TABLET ORAL
Qty: 180 TABLET | Refills: 1 | Status: SHIPPED | OUTPATIENT
Start: 2021-12-29 | End: 2022-06-30 | Stop reason: SDUPTHER

## 2021-12-29 NOTE — PROGRESS NOTES
[facility]   Neurology followup    Subjective:   CC/HP  History was obtained from patient  Patient has a chronic history of seizure disorder. Patient has not had a seizure in a number of years. No side effects of medication  No other new neurological symptoms  Patient has severe obstructive sleep apnea syndrome. Patient states that he is having some difficulty with the mask and uses a CPAP machine only on and off.       REVIEW OF SYSTEMS    Constitutional:  []   Chills   []  Fatigue   []  Fevers   []  Malaise   []  Weight loss     [x] Denies all of the above    Respiratory:   []  Cough    []  Shortness of breath         [x] Denies all of the above     Cardiovascular:   []  Chest pain    []  Exertional chest pressure/discomfort           [] Palpitations    []  Syncope     [x] Denies all of the above        Past Medical History:   Diagnosis Date    Hyperlipidemia     Hypothyroidism     Seizures ([facility] [address]      Family History   Problem Relation Age of Onset    Diabetes Father     High Blood Pressure Father     Other Father         YANICK    Stroke Father      Social History     Socioeconomic History    Marital status: Single     Spouse name: Not on file    Number of children: Not on file    Years of education: Not on file    Highest education level: Not on file   Occupational History    Not on file   Tobacco Use    Smoking status: Never Smoker    Smokeless tobacco: Never Used   Vaping Use    Vaping Use: Never used   Substance and Sexual Activity    Alcohol use: No    Drug use: No    Sexual activity: Not on file   Other Topics Concern    Not on file   Social History Narrative    Not on file     Social Determinants of Health     Financial Resource Strain:     Difficulty of Paying Living Expenses: Not on file   Food Insecurity:     Worried About Running Out of Food in the Last Year: Not on file    David of Food in the Last Year: Not on file   Transportation Needs:     Lack of Transportation (Medical): Not on file    Lack of Transportation (Non-Medical): Not on file   Physical Activity:     Days of Exercise per Week: Not on file    Minutes of Exercise per Session: Not on file   Stress:     Feeling of Stress : Not on file   Social Connections:     Frequency of Communication with Friends and Family: Not on file    Frequency of Social Gatherings with Friends and Family: Not on file    Attends [employer] Services: Not on file    Active Member of [address] [place] [address] [place] or Organizations: Not on file    Attends Club or Organization Meetings: Not on file    Marital Status: Not on file   Intimate Partner Violence:     Fear of Current or Ex-Partner: Not on file    Emotionally Abused: Not on file    Physically Abused: Not on file    Sexually Abused: Not on file   Housing Stability:     Unable to Pay for Housing in the Last Year: Not on file    Number of [place] in the Last Year: Not on file    Unstable Housing in the Last Year: Not on file        Objective:  Exam:  /68   Pulse 50   Ht 5' 2\" (1.575 m)   Wt 160 lb (72.6 kg)   BMI 29.26 kg/m²   This is a well-nourished patient in no acute distress  Patient is awake, alert and oriented x3. Speech is normal.  Pupils are equal round reacting to light. Extraocular movements intact. Face symmetrical. Tongue midline. Motor exam shows normal symmetrical strength. Deep tendon reflexes normal. Plantar reflexes downgoing. Sensory exam normal. Coordination normal. Gait normal. No carotid bruit. No neck stiffness. Impression :  Partial complex seizures, doing very well. I have talked with the patient about stopping seizure medications and he has not had it for a long time. Patient does not want to stop medication even though he has not had a seizure in a number of years  Obstructive sleep apnea syndrome, on CPAP    Plan :  Continue primidone 250 mg, one tablet b.i.d. Recommended using CPAP on a regular basis.               Please note a portion of  this chart was generated using dragon dictation software. Although every effort was made to ensure the accuracy of this automated transcription, some errors in transcription may have occurred.  110 14

## 2021-12-29 NOTE — PROGRESS NOTES
[address]  12/29/21  Referring: Dr. Alvaro Rankin    \"Remy\"    REASON FOR CONSULT/CHIEF COMPLAINT/HPI     Reason for visit/ Chief complaint  New patient  hypertension   HPI Micah Wright is a 66 y.o. seen as a new patient in consult with Dr Alvaro Rankin for hypertension. He has a history of severe YANICK, partial complex seizure, stroke, renal insuff, anemia, thyroid disorder. He has had injections for right shoulder pain. He is a retired  ([address], [employer]). He lives with his sister, nieces and cats. He is a nonsmoker, nondrinker. Mr Isidra Montanez isn't sure why he is here today. He reports high blood pressure and not being happy that his doctor prescribed him a diabetes medicine for his kidneys when he doesn't have diabetes. He states he does not exercise, is typically active outside when the weather is good. He still drives. He is able to walk up a flight of stairs without chest pain, shortness of breath palpitations or dizziness. He has no edema, no orthopnea. Able to push his grocery cart through the grocery store without stopping, or weakness. He reports he has been gaining weight despite only eating 2 meals a day. Patient is adherent with medications and is tolerating them well without side effects     HISTORY/ALLERGIES/ROS     MedHx:  has a past medical history of Hyperlipidemia, Hypothyroidism, and Seizures ([facility] [address] SurgHx:  has no past surgical history on file. SocHx:  reports that he has never smoked. He has never used smokeless tobacco. He reports that he does not drink alcohol and does not use drugs. FamHx: No family history of premature coronary artery disease, sudden death, or aneurysm  Allergies: Linaclotide   ROS:   Review of Systems   Constitutional: Negative for activity change, diaphoresis, fatigue and fever. HENT: Negative for congestion and ear discharge. Eyes: Negative for photophobia and visual disturbance.    Respiratory: Negative for cough, chest tightness and shortness of breath. Cardiovascular: Negative for chest pain and palpitations. Gastrointestinal: Negative for abdominal distention, abdominal pain, blood in stool, constipation and nausea. Endocrine: Negative for cold intolerance and polydipsia. Genitourinary: Negative for difficulty urinating and flank pain. Musculoskeletal: Positive for arthralgias and myalgias. Skin: Negative for rash and wound. Allergic/Immunologic: Negative for environmental allergies and immunocompromised state. Neurological: Negative for dizziness and headaches. Hematological: Negative for adenopathy. Does not bruise/bleed easily. Psychiatric/Behavioral: Negative for confusion. The patient is not hyperactive. MEDICATIONS      Prior to Admission medications    Medication Sig Start Date End Date Taking?  Authorizing Provider   terbinafine (LAMISIL) 250 MG tablet daily  12/21/21  Yes Historical Provider, MD   primidone (MYSOLINE) 250 MG tablet TAKE 1 TABLET BY MOUTH TWICE A DAY 6/29/21  Yes Blake Gibbs MD   amLODIPine (NORVASC) 5 MG tablet Take 10 mg by mouth daily    Yes Historical Provider, MD   atorvastatin (LIPITOR) 80 MG tablet Take 80 mg by mouth daily   Yes Historical Provider, MD   Pantoprazole Sodium (PROTONIX PO) Take by mouth   Yes Historical Provider, MD   IRBESARTAN PO Take 300 mg by mouth daily    Yes Historical Provider, MD   cyclobenzaprine (FLEXERIL) 5 MG tablet Take 5 mg by mouth 3 times daily as needed for Muscle spasms   Yes Historical Provider, MD   Multiple Vitamin (MULTI-VITAMIN DAILY PO) Take by mouth   Yes Historical Provider, MD   clopidogrel (PLAVIX) 75 MG tablet TK 1 T PO QD 9/24/17  Yes Historical Provider, MD   fluticasone (FLONASE) 50 MCG/ACT nasal spray INSTILL 1 SPRAY IEN BID 8/25/17  Yes Historical Provider, MD   clotrimazole-betamethasone (Remy Linda) 1-0.05 % cream TE EXT AA BID 8/7/17  Yes Historical Provider, MD   aspirin 81 MG tablet Take 81 mg by mouth daily   Yes Historical Provider, MD   LEVOTHYROXINE SODIUM 50 mcg by Does not apply route daily    Yes Historical Provider, MD   Omega-3-acid Ethyl Esters (LOVAZA PO) Take  by mouth. Yes Historical Provider, MD   losartan (COZAAR) 100 MG tablet Take 100 mg by mouth daily  Patient not taking: Reported on 12/29/2021    Historical Provider, MD   MELOXICAM PO Take by mouth  Patient not taking: Reported on 12/29/2021    Historical Provider, MD       PHYSICAL EXAM        Vitals:    12/29/21 1115   BP: 130/70   Pulse: 59   SpO2: 98%    Weight: 159 lb 14.4 oz (72.5 kg)     Gen Alert, cooperative, no distress Heart  Regular rate and rhythm, 1/6 systolic murmur   Head Normocephalic, atraumatic, no abnormalities Abd  Soft, NT, +BS, no mass, no OM   Eyes PERRLA, conj/corn clear Ext  Ext nl, AT, no C/C, no edema   Nose Nares normal, no drain age, Non-tender Pulse 2+ and symmetric   Throat Lips, mucosa, tongue normal Skin Color/text/turg nl, no rash/lesions   Neck S/S, TM, NT, no bruit Psych Nl mood and affect   Lung CTA-B, unlabored, no DTP     Ch wall NT, no deform       LABS and Imaging     Relevant and available CV data reviewed  Echo/MRI: none   Cath: none  Holter:none  EKG personally interpreted: 12/29  Sinus gilberto, prior anterior infarct, lafb, poor r wave progression ( no prior for comparison)  Stress:none  Moderate Risk  Moderate Complexity/Medical Decision Making  Outside/Care everywhere records Reviewed  Labs Reviewed  Prior Imaging, ekg,  reviewed when available  Medications reviewed  Old Notes reviewed  ASSESSMENT AND PLAN     1. Essential hypertension  - 130/70  - stable  Plan:  - management per PCP, irbesartan 300 mg and norvasc 5 mg daily      2. Abnormal EKG and systolic murmur  - new problem  Plan:  - echocardiogram to evaluate for structural heart disease    2. YANICK  - severe  Plan:  - recommend daily use of cpap    3.seizure  - partial complex seizure  -stable  Plan:  - continue with primidone 250 mg bid  - followed by Dr Oli Mcpherson    4. Anemia  - 10/29/2021  Folate greater than 44.6  - 10.9/31.9     5. Renal insufficiency  - 10/29/2021  Creat 1.5    6. cva  - diagnosed in 2012- \"mini stroke\"  Plan  - on plavix per pcp    7. Thyroid disorder  Plan  - on levothyroxine    8. Mixed Hyperlipidemia  Plan  - on lipitor 80 mg daily    9. Cognitive impairment? Post-stroke? Seizure disorder    Patient counseled on lifestyle modification, diet, and exercise. Follow Up:   6months    Dr. Tala Eddy:  I, La Nena Thomas, am scribing for and in the presence of David Goodman DO. Sosa Factor 12/29/21 12:00 PM     Physician Attestation  The scribe for and in the presence of migdalia Goodman DO). The scribe  may have prepopulated components of this note with my historical  intellectual property under my direct supervision. Any additions to this intellectual property were performed in my presence and at my direction. Furthermore, the content and accuracy of this note have been reviewed by migdalia Goodman DO).   12/29/2021 12:00 PM

## 2022-06-30 ENCOUNTER — OFFICE VISIT (OUTPATIENT)
Dept: NEUROLOGY | Age: 72
End: 2022-06-30
Payer: COMMERCIAL

## 2022-06-30 VITALS
WEIGHT: 155 LBS | SYSTOLIC BLOOD PRESSURE: 133 MMHG | BODY MASS INDEX: 28.52 KG/M2 | DIASTOLIC BLOOD PRESSURE: 58 MMHG | HEIGHT: 62 IN | HEART RATE: 50 BPM

## 2022-06-30 DIAGNOSIS — G47.33 OBSTRUCTIVE SLEEP APNEA: ICD-10-CM

## 2022-06-30 DIAGNOSIS — G40.209 PARTIAL EPILEPSY WITH IMPAIRMENT OF CONSCIOUSNESS (HCC): Primary | ICD-10-CM

## 2022-06-30 PROBLEM — E78.2 MIXED HYPERLIPIDEMIA: Status: ACTIVE | Noted: 2022-06-30

## 2022-06-30 PROBLEM — I10 ESSENTIAL HYPERTENSION: Status: ACTIVE | Noted: 2022-06-30

## 2022-06-30 PROBLEM — R94.31 ABNORMAL EKG: Status: ACTIVE | Noted: 2022-06-30

## 2022-06-30 PROBLEM — D64.9 ANEMIA: Status: ACTIVE | Noted: 2022-06-30

## 2022-06-30 PROBLEM — I63.9 CEREBROVASCULAR ACCIDENT (CVA) (HCC): Status: ACTIVE | Noted: 2022-06-30

## 2022-06-30 PROBLEM — R56.9 SEIZURE (HCC): Status: ACTIVE | Noted: 2022-06-30

## 2022-06-30 PROBLEM — N28.9 RENAL INSUFFICIENCY: Status: ACTIVE | Noted: 2022-06-30

## 2022-06-30 PROBLEM — E07.9 THYROID DISORDER: Status: ACTIVE | Noted: 2022-06-30

## 2022-06-30 PROCEDURE — 99213 OFFICE O/P EST LOW 20 MIN: CPT | Performed by: PSYCHIATRY & NEUROLOGY

## 2022-06-30 PROCEDURE — 1123F ACP DISCUSS/DSCN MKR DOCD: CPT | Performed by: PSYCHIATRY & NEUROLOGY

## 2022-06-30 RX ORDER — PRIMIDONE 250 MG/1
TABLET ORAL
Qty: 180 TABLET | Refills: 1 | Status: SHIPPED | OUTPATIENT
Start: 2022-06-30

## 2022-06-30 ASSESSMENT — ENCOUNTER SYMPTOMS
BLOOD IN STOOL: 0
SHORTNESS OF BREATH: 0
NAUSEA: 0
ABDOMINAL PAIN: 0
CONSTIPATION: 0
COUGH: 0
CHEST TIGHTNESS: 0
ABDOMINAL DISTENTION: 0
PHOTOPHOBIA: 0

## 2022-06-30 NOTE — PROGRESS NOTES
[facility]   Neurology followup    Subjective:   CC/HP  History was obtained from patient  Patient has a chronic history of seizure disorder. Patient has not had a seizure in a number of years. No side effects of medication  No other new neurological symptoms  Patient has severe obstructive sleep apnea syndrome. Patient is not using a CPAP machine at this time. REVIEW OF SYSTEMS    Constitutional:  []   Chills   []  Fatigue   []  Fevers   []  Malaise   []  Weight loss     [x] Denies all of the above    Respiratory:   []  Cough    []  Shortness of breath         [x] Denies all of the above     Cardiovascular:   []  Chest pain    []  Exertional chest pressure/discomfort           [] Palpitations    []  Syncope     [x] Denies all of the above        Past Medical History:   Diagnosis Date    Hyperlipidemia     Hypothyroidism     Seizures ([facility] [address]      Family History   Problem Relation Age of Onset    Diabetes Father     High Blood Pressure Father     Other Father         YANICK    Stroke Father      Social History     Socioeconomic History    Marital status: Single     Spouse name: Not on file    Number of children: Not on file    Years of education: Not on file    Highest education level: Not on file   Occupational History    Not on file   Tobacco Use    Smoking status: Never Smoker    Smokeless tobacco: Never Used   Vaping Use    Vaping Use: Never used   Substance and Sexual Activity    Alcohol use: No    Drug use: No    Sexual activity: Not on file   Other Topics Concern    Not on file   Social History Narrative    Not on file     Social Determinants of Health     Financial Resource Strain:     Difficulty of Paying Living Expenses: Not on file   Food Insecurity:     Worried About Running Out of Food in the Last Year: Not on file    David of Food in the Last Year: Not on file   Transportation Needs:     Lack of Transportation (Medical):  Not on file    Lack of Transportation (Non-Medical): Not on file   Physical Activity:     Days of Exercise per Week: Not on file    Minutes of Exercise per Session: Not on file   Stress:     Feeling of Stress : Not on file   Social Connections:     Frequency of Communication with Friends and Family: Not on file    Frequency of Social Gatherings with Friends and Family: Not on file    Attends [employer] Services: Not on file    Active Member of [address] [place] [address] or Organizations: Not on file    Attends Club or Organization Meetings: Not on file    Marital Status: Not on file   Intimate Partner Violence:     Fear of Current or Ex-Partner: Not on file    Emotionally Abused: Not on file    Physically Abused: Not on file    Sexually Abused: Not on file   Housing Stability:     Unable to Pay for Housing in the Last Year: Not on file    Number of [place] in the Last Year: Not on file    Unstable Housing in the Last Year: Not on file        Objective:  Exam:  BP (!) 133/58   Pulse 50   Ht 5' 2\" (1.575 m)   Wt 155 lb (70.3 kg)   BMI 28.35 kg/m²   This is a well-nourished patient in no acute distress  Patient is awake, alert and oriented x3. Speech is normal.  Pupils are equal round reacting to light. Extraocular movements intact. Face symmetrical. Tongue midline. Motor exam shows normal symmetrical strength. Deep tendon reflexes normal. Plantar reflexes downgoing. Sensory exam normal. Coordination normal. Gait normal. No carotid bruit. No neck stiffness. Impression :  Partial complex seizures, doing very well. I have talked with the patient about stopping seizure medications and he has not had it for a long time. Patient does not want to stop medication even though he has not had a seizure in a number of years  Obstructive sleep apnea syndrome, on CPAP    Plan :  Continue primidone 250 mg, one tablet b.i.d. Recommended using CPAP on a regular basis.               Please note a portion of  this chart was generated using dragon dictation software. Although every effort was made to ensure the accuracy of this automated transcription, some errors in transcription may have occurred.  110 82

## 2022-06-30 NOTE — PROGRESS NOTES
[address]  6/30/22  Referring: Dr. Jack Gonzalez    \"Remy\"    REASON FOR CONSULT/CHIEF COMPLAINT/HPI     Reason for visit/ Chief complaint  New patient  hypertension   HPI Delmy Muir is a 67 y.o. seen for a 6 month follow up in consult with Dr Jack Gonzalez for hypertension. He has a history of severe YANICK, partial complex seizure, stroke, renal insuff, anemia, thyroid disorder. He has had injections for right shoulder pain. He is a retired  ([address] [employer], [employer]). He lives with his sister, nieces and cats. He is a nonsmoker, nondrinker. Today     Patient is adherent with medications and is tolerating them well without side effects     HISTORY/ALLERGIES/ROS     MedHx:  has a past medical history of Hyperlipidemia, Hypothyroidism, and Seizures ([facility] [address] SurgHx:  has no past surgical history on file. SocHx:  reports that he has never smoked. He has never used smokeless tobacco. He reports that he does not drink alcohol and does not use drugs. FamHx: No family history of premature coronary artery disease, sudden death, or aneurysm  Allergies: Linaclotide   ROS:   Review of Systems   Constitutional: Negative for activity change, diaphoresis, fatigue and fever. HENT: Negative for congestion and ear discharge. Eyes: Negative for photophobia and visual disturbance. Respiratory: Negative for cough, chest tightness and shortness of breath. Cardiovascular: Negative for chest pain and palpitations. Gastrointestinal: Negative for abdominal distention, abdominal pain, blood in stool, constipation and nausea. Endocrine: Negative for cold intolerance and polydipsia. Genitourinary: Negative for difficulty urinating and flank pain. Musculoskeletal: Positive for arthralgias and myalgias. Skin: Negative for rash and wound. Allergic/Immunologic: Negative for environmental allergies and immunocompromised state. Neurological: Negative for dizziness and headaches. tongue normal Skin Color/text/turg nl, no rash/lesions   Neck S/S, TM, NT, no bruit Psych Nl mood and affect   Lung CTA-B, unlabored, no DTP     Ch wall NT, no deform       LABS and Imaging     Relevant and available CV data reviewed  Echo/MRI: none   Cath: none  Holter:none  EKG personally interpreted: 12/29  Sinus gilberto, prior anterior infarct, lafb, poor r wave progression ( no prior for comparison)  Stress:none  Moderate Risk  Moderate Complexity/Medical Decision Making  Outside/Care everywhere records Reviewed  Labs Reviewed  Prior Imaging, ekg,  reviewed when available  Medications reviewed  Old Notes reviewed  ASSESSMENT AND PLAN     1. Essential hypertension  -   - stable  Plan:  - management per PCP, irbesartan 300 mg and norvasc 5 mg daily      2. Abnormal EKG and systolic murmur  - new problem  Plan:  - echocardiogram to evaluate for structural heart disease (not completed)     2. YANICK  - severe  Plan:  - recommend daily use of cpap    3.seizure  - partial complex seizure  -stable  Plan:  - continue with primidone 250 mg bid  - followed by Dr Guillermo Bueno    4. Anemia  - 10/29/2021  Folate greater than 44.6  - 10.9/31.9     5. Renal insufficiency  - 10/29/2021  Creat 1.5    6. cva  - diagnosed in 2012- \"mini stroke\"  Plan  - on plavix per pcp    7. Thyroid disorder  Plan  - on levothyroxine    8. Mixed Hyperlipidemia  Plan  - on lipitor 80 mg daily    9. Cognitive impairment? Post-stroke? Seizure disorder    Patient counseled on lifestyle modification, diet, and exercise.     Follow Up:

## 2022-07-01 ENCOUNTER — OFFICE VISIT (OUTPATIENT)
Dept: CARDIOLOGY CLINIC | Age: 72
End: 2022-07-01
Payer: MEDICARE

## 2022-07-01 VITALS
WEIGHT: 155 LBS | HEIGHT: 62 IN | DIASTOLIC BLOOD PRESSURE: 57 MMHG | HEART RATE: 52 BPM | BODY MASS INDEX: 28.52 KG/M2 | OXYGEN SATURATION: 94 % | SYSTOLIC BLOOD PRESSURE: 107 MMHG

## 2022-07-01 DIAGNOSIS — E07.9 THYROID DISORDER: ICD-10-CM

## 2022-07-01 DIAGNOSIS — I63.9 CEREBROVASCULAR ACCIDENT (CVA), UNSPECIFIED MECHANISM (HCC): ICD-10-CM

## 2022-07-01 DIAGNOSIS — E78.2 MIXED HYPERLIPIDEMIA: ICD-10-CM

## 2022-07-01 DIAGNOSIS — D64.9 ANEMIA, UNSPECIFIED TYPE: ICD-10-CM

## 2022-07-01 DIAGNOSIS — R94.31 ABNORMAL EKG: ICD-10-CM

## 2022-07-01 DIAGNOSIS — I10 ESSENTIAL HYPERTENSION: Primary | ICD-10-CM

## 2022-07-01 DIAGNOSIS — G47.33 OBSTRUCTIVE SLEEP APNEA SYNDROME: ICD-10-CM

## 2022-07-01 DIAGNOSIS — R56.9 SEIZURE (HCC): ICD-10-CM

## 2022-07-01 DIAGNOSIS — N28.9 RENAL INSUFFICIENCY: ICD-10-CM

## 2022-07-01 PROCEDURE — 1123F ACP DISCUSS/DSCN MKR DOCD: CPT | Performed by: INTERNAL MEDICINE

## 2022-07-01 PROCEDURE — 99214 OFFICE O/P EST MOD 30 MIN: CPT | Performed by: INTERNAL MEDICINE

## 2022-07-01 RX ORDER — NYSTATIN 100000 U/G
CREAM TOPICAL
COMMUNITY
Start: 2022-06-15

## 2022-07-01 ASSESSMENT — ENCOUNTER SYMPTOMS
CHEST TIGHTNESS: 0
PHOTOPHOBIA: 0
NAUSEA: 0
COUGH: 0
SHORTNESS OF BREATH: 0
ABDOMINAL PAIN: 0
BLOOD IN STOOL: 0
CONSTIPATION: 0
ABDOMINAL DISTENTION: 0

## 2022-07-01 NOTE — LETTER
[address] [place] [address]  [address] [address]  Phone: [phone]  Fax: [address], [place]    July 4, 2022     Robinson Huston, [facility]    Patient: Lester Richardson   MR Number: [id number]   YOB: 1950   Date of Visit: 7/1/2022       Dear Robinson Huston: Thank you for referring Lester Richardson to me for evaluation/treatment. Below are the relevant portions of my assessment and plan of care. If you have questions, please do not hesitate to call me. I look forward to following Jamaal Vera along with you.     Sincerely,      Courtney Sarmiento, DO

## 2022-07-06 PROBLEM — N18.2 STAGE 2 CHRONIC KIDNEY DISEASE: Status: ACTIVE | Noted: 2022-07-06

## 2022-08-09 NOTE — PROGRESS NOTES
[address]  22  Referring: Dr. Juan Carlos Lockhart    \"Remy\"    REASON FOR CONSULT/CHIEF COMPLAINT/HPI     Reason for visit/ Chief complaint  New patient  hypertension   HPI Johanne Burrows is a 67 y.o. seen for a 6 month follow up for hypertension managment. He has a history of severe YANICK, partial complex seizure, stroke, renal insuff, anemia, thyroid disorder. He has had injections for right shoulder pain. He is a retired  ([address], [employer] He lives with his  Younger sister, and cats. His niece (age 44)  of an MI last month. He is a nonsmoker, nondrinker. He was seen by Dr Peace Cevallos yesterday, advised to continue primidone and cpap  Today he is doing well. He has no chest pain,shortness of breath  Palpitations or dizziness  No syncope  No edema or orthopnea     Patient is adherent with medications and is tolerating them well without side effects     HISTORY/ALLERGIES/ROS     MedHx:  has a past medical history of Hyperlipidemia, Hypothyroidism, and Seizures ([facility] [address] SurgHx:  has no past surgical history on file. SocHx:  reports that he has never smoked. He has never used smokeless tobacco. He reports that he does not drink alcohol and does not use drugs. FamHx: No family history of premature coronary artery disease, sudden death, or aneurysm  Allergies: Linaclotide   ROS:   Review of Systems   Constitutional: Negative for activity change, diaphoresis, fatigue and fever. HENT: Negative for congestion and ear discharge. Eyes: Negative for photophobia and visual disturbance. Respiratory: Negative for cough, chest tightness and shortness of breath. Cardiovascular: Negative for chest pain and palpitations. Gastrointestinal: Negative for abdominal distention, abdominal pain, blood in stool, constipation and nausea. Endocrine: Negative for cold intolerance and polydipsia. Genitourinary: Negative for difficulty urinating and flank pain.    Musculoskeletal: Positive for arthralgias and myalgias. Skin: Negative for rash and wound. Allergic/Immunologic: Negative for environmental allergies and immunocompromised state. Neurological: Negative for dizziness and headaches. Hematological: Negative for adenopathy. Does not bruise/bleed easily. Psychiatric/Behavioral: Negative for confusion. The patient is not hyperactive. MEDICATIONS      Prior to Admission medications    Medication Sig Start Date End Date Taking? Authorizing Provider   nystatin (MYCOSTATIN) 497100 UNIT/GM cream APPLY 1 APPLICATION TOPICALLY ON THE SKIN TWICE A DAY 6/15/22  Yes Historical Provider, MD   primidone (MYSOLINE) 250 MG tablet TAKE 1 TABLET BY MOUTH TWICE A DAY 6/30/22  Yes Blake Gibbs MD   amLODIPine (NORVASC) 5 MG tablet Take 10 mg by mouth daily    Yes Historical Provider, MD   atorvastatin (LIPITOR) 80 MG tablet Take 80 mg by mouth daily   Yes Historical Provider, MD   IRBESARTAN PO Take 300 mg by mouth daily    Yes Historical Provider, MD   Multiple Vitamin (MULTI-VITAMIN DAILY PO) Take by mouth   Yes Historical Provider, MD   clopidogrel (PLAVIX) 75 MG tablet TK 1 T PO QD 9/24/17  Yes Historical Provider, MD   fluticasone (FLONASE) 50 MCG/ACT nasal spray INSTILL 1 SPRAY IEN BID 8/25/17  Yes Historical Provider, MD   clotrimazole-betamethasone ([place]) 1-0.05 % cream TE EXT AA BID 8/7/17  Yes Historical Provider, MD   aspirin 81 MG tablet Take 81 mg by mouth daily   Yes Historical Provider, MD   LEVOTHYROXINE SODIUM 50 mcg by Does not apply route daily    Yes Historical Provider, MD   Omega-3-acid Ethyl Esters (LOVAZA PO) Take  by mouth.    Yes Historical Provider, MD   terbinafine (LAMISIL) 250 MG tablet daily  12/21/21   Historical Provider, MD   Pantoprazole Sodium (PROTONIX PO) Take by mouth  Patient not taking: Reported on 7/1/2022    Historical Provider, MD   cyclobenzaprine (FLEXERIL) 5 MG tablet Take 5 mg by mouth 3 times daily as needed for Muscle spasms  Patient not taking: Reported on 7/1/2022    Historical Provider, MD       PHYSICAL EXAM        Vitals:    07/01/22 1522   BP: (!) 107/57   Pulse: 52   SpO2: 94%    Weight: 155 lb (70.3 kg)     Gen Alert, cooperative, no distress Heart  Regular rate and rhythm, 1/6 systolic murmur   Head Normocephalic, atraumatic, no abnormalities Abd  Soft, NT, +BS, no mass, no OM   Eyes PERRLA, conj/corn clear Ext  Ext nl, AT, no C/C, no edema   Nose Nares normal, no drain age, Non-tender Pulse 2+ and symmetric   Throat Lips, mucosa, tongue normal Skin Color/text/turg nl, no rash/lesions   Neck S/S, TM, NT, no bruit Psych Nl mood and affect   Lung CTA-B, unlabored, no DTP     Ch wall NT, no deform       LABS and Imaging     Relevant and available CV data reviewed  Echo/MRI: none   Cath: none  Holter:none  EKG personally interpreted: 12/29  Sinus gilberto, prior anterior infarct, lafb, poor r wave progression ( no prior for comparison)  Stress:none  Moderate Risk  Moderate Complexity/Medical Decision Making  Outside/Care everywhere records Reviewed  Labs Reviewed  Prior Imaging, ekg,  reviewed when available  Medications reviewed  Old Notes reviewed  ASSESSMENT AND PLAN     1. Essential hypertension  - 107/57  - stable  Plan:  - management per PCP, irbesartan 300 mg and norvasc 5 mg daily      2. Abnormal EKG and systolic murmur  -  Sinus gilberto, prior anterior infarct, lafb, poor r wave progression   -  new problem  Plan:  -  echocardiogram to evaluate for structural heart disease    -  Recommend 150 minutes of exercise weekly  -  Recommend less fast food    2. YANICK  -  severe  Plan:  -  recommend daily use of cpap    3.seizure  -  partial complex seizure  -  stable  Plan:  -  continue with primidone 250 mg bid  -  followed by Dr Megan Gutierrez    4. Anemia  -  10/29/2021  Folate greater than 44.6  -  10.9/31.9     5.  Renal insufficiency  -  10/29/2021  Creat 1.5    6. cva  - diagnosed in 2012- \"mini stroke\"  Plan  - on plavix per pcp    7. Thyroid disorder  Plan  - on levothyroxine    8. Mixed Hyperlipidemia  Plan  - on lipitor 80 mg daily    9. Cognitive impairment? Post-stroke? Seizure disorder    Patient counseled on lifestyle modification, diet, and exercise.     Follow Up:   6 months      Scribe Attestation:  Iveth Santos am scribing for and in the presence of Rito Day MD.   Jo Segura 07/01/22 3:41 PM No PO trials administered. +Non-nutritive swallow intact

## 2022-11-03 ENCOUNTER — HOSPITAL ENCOUNTER (OUTPATIENT)
Dept: NON INVASIVE DIAGNOSTICS | Age: 72
Discharge: HOME | End: 2022-11-03
Payer: MEDICARE

## 2022-11-03 DIAGNOSIS — R94.31 ABNORMAL EKG: ICD-10-CM

## 2022-11-03 DIAGNOSIS — I10 ESSENTIAL HYPERTENSION: ICD-10-CM

## 2022-11-03 LAB
LV EF: 68 %
LVEF MODALITY: NORMAL

## 2022-11-03 PROCEDURE — 93306 TTE W/DOPPLER COMPLETE: CPT

## 2022-11-10 ENCOUNTER — TELEPHONE (OUTPATIENT)
Dept: CARDIOLOGY CLINIC | Age: 72
End: 2022-11-10

## 2022-11-10 NOTE — TELEPHONE ENCOUNTER
----- Message from Earle Thompson RN sent at 11/10/2022  8:08 AM EST -----  Dkw reviewed echo which showed changes from htn, continue blood pressure medication and keep a log of them.  Make follow up in jan/feb

## 2022-11-10 NOTE — RESULT ENCOUNTER NOTE
Dkw reviewed echo which showed changes from htn, continue blood pressure medication and keep a log of them.  Make follow up in jan/feb

## 2022-11-11 NOTE — TELEPHONE ENCOUNTER
Spoke to pt and advised him of message below, pt v/u but stated he didn't know how he's going to record bp due to not having a bp machine.

## 2022-11-11 NOTE — TELEPHONE ENCOUNTER
He can go to [employer] to check it, go to a firehouse, or, make an appt here weekly to have an MA check it.  thanks

## 2022-11-17 NOTE — TELEPHONE ENCOUNTER
I spoke with pt and relayed message per MMRN. He stated that he will have his sister take his BP. She is a nurse.

## 2022-12-16 ENCOUNTER — OFFICE VISIT (OUTPATIENT)
Dept: NEUROLOGY | Age: 72
End: 2022-12-16
Payer: MEDICARE

## 2022-12-16 VITALS
DIASTOLIC BLOOD PRESSURE: 75 MMHG | HEART RATE: 53 BPM | BODY MASS INDEX: 30.18 KG/M2 | WEIGHT: 164 LBS | HEIGHT: 62 IN | SYSTOLIC BLOOD PRESSURE: 135 MMHG

## 2022-12-16 DIAGNOSIS — G40.209 PARTIAL EPILEPSY WITH IMPAIRMENT OF CONSCIOUSNESS (HCC): ICD-10-CM

## 2022-12-16 PROCEDURE — 3074F SYST BP LT 130 MM HG: CPT | Performed by: PSYCHIATRY & NEUROLOGY

## 2022-12-16 PROCEDURE — 99213 OFFICE O/P EST LOW 20 MIN: CPT | Performed by: PSYCHIATRY & NEUROLOGY

## 2022-12-16 PROCEDURE — 3078F DIAST BP <80 MM HG: CPT | Performed by: PSYCHIATRY & NEUROLOGY

## 2022-12-16 PROCEDURE — 1123F ACP DISCUSS/DSCN MKR DOCD: CPT | Performed by: PSYCHIATRY & NEUROLOGY

## 2022-12-16 RX ORDER — PRIMIDONE 250 MG/1
TABLET ORAL
Qty: 180 TABLET | Refills: 1 | Status: SHIPPED | OUTPATIENT
Start: 2022-12-16

## 2022-12-16 NOTE — PROGRESS NOTES
Elisha Ortega   Neurology followup    Subjective:   CC/HP  History was obtained from patient  Patient has a chronic history of seizure disorder. Patient has not had a seizure in a number of years. No side effects of medication  No other new neurological symptoms  Patient has severe obstructive sleep apnea syndrome. Patient is not using a CPAP machine at this time. REVIEW OF SYSTEMS    Constitutional:  []   Chills   []  Fatigue   []  Fevers   []  Malaise   []  Weight loss     [x] Denies all of the above    Respiratory:   []  Cough    []  Shortness of breath         [x] Denies all of the above     Cardiovascular:   []  Chest pain    []  Exertional chest pressure/discomfort           [] Palpitations    []  Syncope     [x] Denies all of the above        Past Medical History:   Diagnosis Date    Hyperlipidemia     Hypothyroidism     Seizures ([facility] [address]      Family History   Problem Relation Age of Onset    Diabetes Father     High Blood Pressure Father     Other Father         YANICK    Stroke Father      Social History     Socioeconomic History    Marital status: Single     Spouse name: None    Number of children: None    Years of education: None    Highest education level: None   Tobacco Use    Smoking status: Never    Smokeless tobacco: Never   Vaping Use    Vaping Use: Never used   Substance and Sexual Activity    Alcohol use: No    Drug use: No   Social History Narrative    Single  no children nonsmoker , non drinker  worked as  , dish washing , snow removal         Objective:  Exam:  /75   Pulse 53   Ht 5' 2\" (1.575 m)   Wt 164 lb (74.4 kg)   BMI 30.00 kg/m²   This is a well-nourished patient in no acute distress  Patient is awake, alert and oriented x3. Speech is normal.  Pupils are equal round reacting to light. Extraocular movements intact. Face symmetrical. Tongue midline. Motor exam shows normal symmetrical strength. Deep tendon reflexes normal. Plantar reflexes downgoing.   Sensory exam normal. Coordination normal. Gait normal. No carotid bruit. No neck stiffness. Impression :  Partial complex seizures, doing very well. I have talked with the patient about stopping seizure medications and he has not had it for a long time. Patient does not want to stop medication even though he has not had a seizure in a number of years  Obstructive sleep apnea syndrome, on CPAP    Plan :  Continue primidone 250 mg, one tablet b.i.d. Recommended using CPAP on a regular basis. Please note a portion of  this chart was generated using dragon dictation software. Although every effort was made to ensure the accuracy of this automated transcription, some errors in transcription may have occurred.  110 91

## 2023-01-05 ENCOUNTER — TELEPHONE (OUTPATIENT)
Dept: CARDIOLOGY CLINIC | Age: 73
End: 2023-01-05

## 2023-01-05 NOTE — TELEPHONE ENCOUNTER
Pt has his 6 mo fu w/DKW on  3/02 and he is needing his order for the Echo to be put in Epic so he can call to schedule his test.  Please advise

## 2023-02-02 NOTE — PROGRESS NOTES
Pre-procedure instructions reviewed with pt. He has a very difficult time retaining information. He has not received instructions regarding plavix and does not have laxative. 's office contacted with this information.

## 2023-02-02 NOTE — PROGRESS NOTES
Patient reached __X__ yes  _____ no   VM instructions left ____ yes   phone number ________                                ____ no-office notified          Date __2/14/23_______  Time __1100_____  Arrival __0930  hosp-endo____    Nothing to eat or drink after midnight-follow your doctors prep instructions-this may include taking a second dose of your prep after midnight  Responsible adult 25 or older to stay on site while you are here-drive you home-stay with you after  Follow any instructions your doctors office has given you  Bring a complete list of all your medications and supplements including name,dose,how often taken the day of your procedure  If you normally take the following medications in the morning please do so the AM of your procedure with a small sip of water       Heart,blood pressure,seizure,thyroid or breathing medications-use your inhalers-bring any rescue inhalers with you DOS       DO NOT take blood pressure medications ending in \"akshat\" or \"pril\" the AM of procedure or evening prior-DO NOT TAKE IRBESARTAN  Dr Capps Ohfrankie patients are not to take any medications the AM of surgery  Take half or your normal dose of any long acting insulins the night before your procedure-do not take any diabetic medications the AM of procedure  Follow your doctors instructions regarding stopping or taking  any blood thinners-if you do not have instructions-call them  Any questions call your doctor-CHECK PLAVIX  Other ________take primidone, amlodipine, levothyrixine am of procedure______________________________________________________      Verlan Neil POLICY(subject to change)             The current policy is 2 visitors per patient. There are no children allowed. Mask at discretion of facility. Visiting hours are 8a-8p. Overnight visitors will be at the discretion of the nurse. All policies are subject to change.

## 2023-02-14 ENCOUNTER — ANESTHESIA (OUTPATIENT)
Dept: ENDOSCOPY | Age: 73
End: 2023-02-14
Payer: MEDICARE

## 2023-02-14 ENCOUNTER — HOSPITAL ENCOUNTER (OUTPATIENT)
Age: 73
Setting detail: OUTPATIENT SURGERY
Discharge: HOME | End: 2023-02-14
Attending: SURGERY | Admitting: SURGERY
Payer: MEDICARE

## 2023-02-14 ENCOUNTER — ANESTHESIA EVENT (OUTPATIENT)
Dept: ENDOSCOPY | Age: 73
End: 2023-02-14
Payer: MEDICARE

## 2023-02-14 VITALS
WEIGHT: 153 LBS | HEART RATE: 53 BPM | DIASTOLIC BLOOD PRESSURE: 67 MMHG | SYSTOLIC BLOOD PRESSURE: 139 MMHG | HEIGHT: 62 IN | TEMPERATURE: 96.9 F | OXYGEN SATURATION: 98 % | RESPIRATION RATE: 16 BRPM | BODY MASS INDEX: 28.16 KG/M2

## 2023-02-14 PROCEDURE — 3700000000 HC ANESTHESIA ATTENDED CARE: Performed by: SURGERY

## 2023-02-14 PROCEDURE — 2709999900 HC NON-CHARGEABLE SUPPLY: Performed by: SURGERY

## 2023-02-14 PROCEDURE — 7100000011 HC PHASE II RECOVERY - ADDTL 15 MIN: Performed by: SURGERY

## 2023-02-14 PROCEDURE — 7100000010 HC PHASE II RECOVERY - FIRST 15 MIN: Performed by: SURGERY

## 2023-02-14 PROCEDURE — 6360000002 HC RX W HCPCS: Performed by: REGISTERED NURSE

## 2023-02-14 PROCEDURE — 3609027000 HC COLONOSCOPY: Performed by: SURGERY

## 2023-02-14 PROCEDURE — 2500000003 HC RX 250 WO HCPCS: Performed by: REGISTERED NURSE

## 2023-02-14 PROCEDURE — 3700000001 HC ADD 15 MINUTES (ANESTHESIA): Performed by: SURGERY

## 2023-02-14 PROCEDURE — 2580000003 HC RX 258: Performed by: SURGERY

## 2023-02-14 RX ORDER — SODIUM CHLORIDE 9 MG/ML
INJECTION, SOLUTION INTRAVENOUS CONTINUOUS
Status: DISCONTINUED | OUTPATIENT
Start: 2023-02-14 | End: 2023-02-14 | Stop reason: HOSPADM

## 2023-02-14 RX ORDER — PROPOFOL 10 MG/ML
INJECTION, EMULSION INTRAVENOUS CONTINUOUS PRN
Status: DISCONTINUED | OUTPATIENT
Start: 2023-02-14 | End: 2023-02-14 | Stop reason: SDUPTHER

## 2023-02-14 RX ORDER — EPHEDRINE SULFATE/0.9% NACL/PF 50 MG/5 ML
SYRINGE (ML) INTRAVENOUS PRN
Status: DISCONTINUED | OUTPATIENT
Start: 2023-02-14 | End: 2023-02-14 | Stop reason: SDUPTHER

## 2023-02-14 RX ORDER — LIDOCAINE HYDROCHLORIDE 20 MG/ML
INJECTION, SOLUTION EPIDURAL; INFILTRATION; INTRACAUDAL; PERINEURAL PRN
Status: DISCONTINUED | OUTPATIENT
Start: 2023-02-14 | End: 2023-02-14 | Stop reason: SDUPTHER

## 2023-02-14 RX ORDER — PROPOFOL 10 MG/ML
INJECTION, EMULSION INTRAVENOUS PRN
Status: DISCONTINUED | OUTPATIENT
Start: 2023-02-14 | End: 2023-02-14 | Stop reason: SDUPTHER

## 2023-02-14 RX ADMIN — LIDOCAINE HYDROCHLORIDE 70 MG: 20 INJECTION, SOLUTION EPIDURAL; INFILTRATION; INTRACAUDAL; PERINEURAL at 11:46

## 2023-02-14 RX ADMIN — SODIUM CHLORIDE: 9 INJECTION, SOLUTION INTRAVENOUS at 10:17

## 2023-02-14 RX ADMIN — Medication 5 MG: at 12:02

## 2023-02-14 RX ADMIN — PROPOFOL 100 MCG/KG/MIN: 10 INJECTION, EMULSION INTRAVENOUS at 11:46

## 2023-02-14 RX ADMIN — PROPOFOL 50 MG: 10 INJECTION, EMULSION INTRAVENOUS at 11:46

## 2023-02-14 ASSESSMENT — PAIN - FUNCTIONAL ASSESSMENT: PAIN_FUNCTIONAL_ASSESSMENT: 0-10

## 2023-02-14 NOTE — ANESTHESIA POSTPROCEDURE EVALUATION
Department of Anesthesiology  Postprocedure Note    Patient: Remy Giron  MRN: [id number]  YOB: 1950  Date of evaluation: 2/14/2023      Procedure Summary     Date: 02/14/23 Room / Location: [place] [address] / [facility]    Anesthesia Start: 1142 Anesthesia Stop: 1217    Procedure: COLONOSCOPY DIAGNOSTIC Diagnosis:       Screen for colon cancer      (Screen for colon cancer [Z12.11])    Surgeons: Alphonso Stevens MD Responsible Provider: Tiffanie Ann MD    Anesthesia Type: MAC ASA Status: 3          Anesthesia Type: No value filed.    Sasha Phase I: Sasha Score: 10    Sasha Phase II: Sasha Score: 10      Anesthesia Post Evaluation    Patient location during evaluation: PACU  Patient participation: complete - patient participated  Level of consciousness: awake  Airway patency: patent  Nausea & Vomiting: no vomiting  Complications: no  Cardiovascular status: hemodynamically stable  Respiratory status: acceptable  Hydration status: euvolemic  There was medical reason for not using a multimodal analgesia pain management approach.

## 2023-02-14 NOTE — OP NOTE
Patient: Dione Huizar  YOB: 1950  MRN: [id number]  [facility]:  [id number]  Provider:  Trina Bey MD    Date of Procedure: 2/14/2023    Pre-Op Diagnosis: History of colon polyps    Post-Op Diagnosis: Diverticulosis of the colon      Procedure colonoscopy up to cecum    Surgeon(s):  Trina Bey MD    Anesthesia: Monitor Anesthesia Care    Estimated Blood Loss (mL): 0 the cecum ileocecal valve was within normal limits    Detailed Description of Procedure: The patient was placed in the left lateral position. Olympus colonoscope was inserted all the way up to the cecum. The cecum ileocecal valve was within normal limits. No polyps were found. No cancer was found. Colon mucosa was normal.  He had diverticulosis of colon. His colonoscopy was normal    Patient tolerated the procedure well and left the endoscopy room in a satisfactory condition.     Electronically signed by Trina Bey MD on 2/14/2023 at 12:11 PM

## 2023-02-14 NOTE — ANESTHESIA PRE PROCEDURE
Department of Anesthesiology  Preprocedure Note       Name:  Len العلي   Age:  67 y.o.  :  1950                                          MRN:  [id number]         Date:  2023      Surgeon: Yaneth Mccarthy):  Flor Spivey MD    Procedure: Procedure(s):  COLONOSCOPY DIAGNOSTIC    Medications prior to admission:   Prior to Admission medications    Medication Sig Start Date End Date Taking?  Authorizing Provider   mupirocin (BACTROBAN) 2 % nasal ointment by Nasal route 2 times daily Apply to right toe   Yes Historical Provider, MD   primidone (MYSOLINE) 250 MG tablet TAKE 1 TABLET BY MOUTH TWICE A DAY 22   Adonis Laird MD   fluticasone (FLONASE) 50 MCG/ACT nasal spray 1 spray by Each Nostril route daily  Patient taking differently: 1 spray by Each Nostril route in the morning and at bedtime 22   Ada Joseph MD   nystatin (MYCOSTATIN) 841641 UNIT/GM cream APPLY 1 APPLICATION TOPICALLY ON THE SKIN TWICE A DAY 6/15/22   Historical Provider, MD   amLODIPine (NORVASC) 5 MG tablet Take 10 mg by mouth daily     Historical Provider, MD   atorvastatin (LIPITOR) 80 MG tablet Take 80 mg by mouth daily    Historical Provider, MD   IRBESARTAN PO Take 300 mg by mouth daily     Historical Provider, MD   Multiple Vitamin (MULTI-VITAMIN DAILY PO) Take by mouth    Historical Provider, MD   clopidogrel (PLAVIX) 75 MG tablet TK 1 T PO QD 17   Historical Provider, MD   aspirin 81 MG tablet Take 81 mg by mouth daily    Historical Provider, MD   LEVOTHYROXINE SODIUM 50 mcg by Does not apply route daily     Historical Provider, MD   Omega-3-acid Ethyl Esters (LOVAZA PO) Take by mouth daily (with breakfast)    Historical Provider, MD       Current medications:    Current Facility-Administered Medications   Medication Dose Route Frequency Provider Last Rate Last Admin    0.9 % sodium chloride infusion   IntraVENous Continuous Flor Spivey  mL/hr at 23 1017 New Bag at 23 1017 Allergies: Allergies   Allergen Reactions    Linaclotide Diarrhea       Problem List:    Patient Active Problem List   Diagnosis Code    [facility] consciousness [facility] G40.209    Acquired hypothyroidism E03.9    Obstructive sleep apnea syndrome G47.33    Essential hypertension I10    Abnormal EKG R94.31    Seizure ([facility] R56.9    Anemia D64.9    Renal insufficiency N28.9    Cerebrovascular accident (CVA) ([facility] I63.9    Thyroid disorder E07.9    Mixed hyperlipidemia E78.2    Stage 2 chronic kidney disease N18.2       Past Medical History:        Diagnosis Date    Cerebral artery occlusion with cerebral infarction ([facility] 2012    mini-stroke    Hyperlipidemia     Hypothyroidism     Seizures ([facility]     none in several years    Sleep apnea     does not use CPAP       Past Surgical History:  History reviewed. No pertinent surgical history. Social History:    Social History     Tobacco Use    Smoking status: Never    Smokeless tobacco: Never   Substance Use Topics    Alcohol use: No                                Counseling given: Not Answered      Vital Signs (Current):   Vitals:    02/02/23 1058 02/14/23 0950   BP:  (!) 141/52   Pulse:  (!) 49   Resp:  15   Temp:  97.2 °F (36.2 °C)   TempSrc:  Temporal   SpO2:  100%   Weight: 153 lb (69.4 kg) 153 lb (69.4 kg)   Height: 5' 2\" (1.575 m) 5' 2\" (1.575 m)                                              BP Readings from Last 3 Encounters:   02/14/23 (!) 141/52   12/16/22 135/75   07/06/22 122/60       NPO Status: Time of last liquid consumption: 0730                        Time of last solid consumption: 1930                        Date of last liquid consumption: 02/14/23                        Date of last solid food consumption: 02/12/23    BMI:   Wt Readings from Last 3 Encounters:   02/14/23 153 lb (69.4 kg)   12/16/22 164 lb (74.4 kg)   07/06/22 164 lb (74.4 kg)     Body mass index is 27.98 kg/m².     CBC: No results found for: WBC, RBC, HGB, HCT, MCV, RDW, PLT    CMP: No results found for: NA, K, CL, CO2, BUN, CREATININE, GFRAA, AGRATIO, LABGLOM, GLUCOSE, GLU, PROT, CALCIUM, BILITOT, ALKPHOS, AST, ALT    POC Tests: No results for input(s): POCGLU, POCNA, POCK, POCCL, POCBUN, POCHEMO, POCHCT in the last 72 hours. Coags: No results found for: PROTIME, INR, APTT    HCG (If Applicable): No results found for: PREGTESTUR, PREGSERUM, HCG, HCGQUANT     ABGs: No results found for: PHART, PO2ART, SVJ4RIR, IYC9HNC, BEART, W8KZWQSR     Type & Screen (If Applicable):  No results found for: LABABO, LABRH    Drug/Infectious Status (If Applicable):  No results found for: HIV, HEPCAB    COVID-19 Screening (If Applicable): No results found for: COVID19        Anesthesia Evaluation  Patient summary reviewed and Nursing notes reviewed no history of anesthetic complications:   Airway: Mallampati: IV  TM distance: >3 FB   Neck ROM: full  Comment: Large tongue, small mouth opening  Mouth opening: < 3 FB   Dental: normal exam         Pulmonary:   (+) sleep apnea: on noncompliant,                             Cardiovascular:    (+) hypertension:, hyperlipidemia    (-) CABG/stent, dysrhythmias and  angina                Neuro/Psych:   (+) seizures (last seizure 1971): well controlled, CVA:,             GI/Hepatic/Renal:   (+) renal disease: CRI,          ROS comment: occ gerd sx. Denies gerd sx today or last night. Endo/Other:    (+) hypothyroidism, blood dyscrasia: anticoagulation therapy:., .                  ROS comment: No known FH of problems with GA Abdominal:             Vascular: Other Findings:           Anesthesia Plan      MAC     ASA 3       Induction: intravenous. Anesthetic plan and risks discussed with patient and sibling. Plan discussed with CRNA.                     Kadie Marvin MD   2/14/2023

## 2023-02-14 NOTE — H&P
Subjective: Leonel Reece is a 67 y.o. male who was referred for evaluation of previous adenomatous polyps. Patient's medications, allergies, past medical, surgical, social and family histories were reviewed and updated as appropriate. Past medical history:  has a past medical history of Cerebral artery occlusion with cerebral infarction ([facility] Hyperlipidemia, Hypothyroidism, Seizures ([facility] and Sleep apnea. Past surgical history:  has no past surgical history on file. Past social history:  reports that he has never smoked. He has never used smokeless tobacco. He reports that he does not drink alcohol and does not use drugs. Past family history: family history includes Diabetes in his father; High Blood Pressure in his father; Other in his father; Stroke in his father. Allergies: Allergies   Allergen Reactions    Linaclotide Diarrhea     Current medications:   Current Facility-Administered Medications:     0.9 % sodium chloride infusion, , IntraVENous, Continuous, Rubens Hightower MD, Last Rate: 100 mL/hr at 02/14/23 1017, New Bag at 02/14/23 1017    Review of Systems  A comprehensive review of systems was negative. Objective:     BP (!) 141/52   Pulse (!) 49   Temp 97.2 °F (36.2 °C) (Temporal)   Resp 15   Ht 5' 2\" (1.575 m)   Wt 153 lb (69.4 kg)   SpO2 100%   BMI 27.98 kg/m²   General appearance: alert, appears stated age and cooperative  Eyes: conjunctivae/corneas clear. PERRL, EOM's intact. Fundi benign. Ears: normal TM's and external ear canals both ears  Heart: regular rate and rhythm, S1, S2 normal, no murmur, click, rub or gallop  Abdomen: soft, non-tender; bowel sounds normal; no masses,  no organomegaly  Lungs: clear to auscultation bilaterally  Rectal: normal tone    Plan:     1. Colonoscopy.

## 2023-02-14 NOTE — DISCHARGE INSTRUCTIONS
COLONSCOPY DISCHARGE INSTRUCTIONS    You may experience some lightheadedness for the next several hours. Plan on quiet relaxation for the rest of today. Nap for four hours following procedure if possible. A responsible adult needs to stay with you today. Eat bland food and avoid anything greasy or spicy initially-progress to your normal diet gradually. Diet restrictions as instructed. You may resume home medications as instructed. It is not unusual to experience some mild cramping or gas pains, and you may not have a bowel movement for several days. If you had a polyp removed, avoid strenuous activity for 48 hours. Avoid the use of aspirin or related compounds for one week, unless otherwise instructed by your physician. You may notice a small amount of blood in your next few bowel movements, but if a large amount passes, call your physician. If you have any of the following problems, notify your physician or return to the hospital emergency room : fever, chills, excessive bleeding, excessive vomiting, difficulty swallowing, uncontrolled pain, increased abdominal distention, shortness of breath or any other problems. Call your doctor at [phone] if you have any concerns. If you had any biopsies or polyps call for results in 5-7 business days. See your physician's report for details about your procedure and recommendations. ANESTHESIA DISCHARGE INSTRUCTIONS    Wear your seatbelt home. You are under the influence of drugs-do not drink alcohol, drive ,operate machinery,or make any important decisions or sign any legal documentsfor 24 hours. You may resume normal activities tomorrow. A responsible adult needs to be with you for 24 hours. You may experience lightheadedness,dizziness,or sleepiness following surgery. Rest at home today- increase activity as tolerated. It is recommended to take a four hour nap after procedure.   Progress slowly to a regular diet unless your physician has instructed you otherwise. Avoid spicy and greasy food on first meal.  Drink plenty of water. If nausea becomes a problem call your physician. Call your doctor if concerns arise. High-Fiber Diet: Care Instructions  Overview     A high-fiber diet may help you relieve constipation and feel less bloated. Your doctor and dietitian will help you make a high-fiber eating plan based on your personal needs. The plan will include the things you like to eat. It will also make sure that you get 25 to 35 grams of fiber a day. Before you make changes to the way you eat, be sure to talk with your doctor or dietitian. Follow-up care is a key part of your treatment and safety. Be sure to make and go to all appointments, and call your doctor if you are having problems. It's also a good idea to know your test results and keep a list of the medicines you take. How can you care for yourself at home? You can increase how much fiber you get if you eat more of certain foods. These foods include:  Whole-grain breads and cereals. Fruits, such as pears, apples, and peaches. Eat the skins and peels if you can. Vegetables, such as broccoli, cabbage, spinach, carrots, asparagus, and squash. Starchy vegetables. These include potatoes with skins, kidney beans, and lima beans. Take a fiber supplement every day if your doctor recommends it. Examples are Benefiber, Citrucel, FiberCon, and Metamucil. Ask your doctor how much to take. Drink plenty of fluids. If you have kidney, heart, or liver disease and have to limit fluids, talk with your doctor before you increase the amount of fluids you drink. Where can you learn more? Go to http://www.woods.com/ and enter O985 to learn more about \"High-Fiber Diet: Care Instructions. \"  Current as of: May 9, 2022               Content Version: 13.5  © 6155-2664 Healthwise, Incorporated. Care instructions adapted under license by [facility] ([facility]).  If you have questions about a medical condition or this instruction, always ask your healthcare professional. [address] any warranty or liability for your use of this information. Diverticulosis: Care Instructions  Your Care Instructions  In diverticulosis, pouches called diverticula form in the wall of the large intestine (colon). The pouches do not cause any pain or other symptoms. Most people who have diverticulosis do not know they have it. But the pouches sometimes bleed, and if they become infected, they can cause pain and other symptoms. When this happens, it is called diverticulitis. Diverticula form when pressure pushes the wall of the colon outward at certain weak points. A diet that is too low in fiber can cause diverticula. Follow-up care is a key part of your treatment and safety. Be sure to make and go to all appointments, and call your doctor if you are having problems. It's also a good idea to know your test results and keep a list of the medicines you take. How can you care for yourself at home? Include fruits, leafy green vegetables, beans, and whole grains in your diet each day. These foods are high in fiber. Take a fiber supplement, such as Citrucel or Metamucil, every day if needed. Read and follow all instructions on the label. Drink plenty of fluids. If you have kidney, heart, or liver disease and have to limit fluids, talk with your doctor before you increase the amount of fluids you drink. Get at least 30 minutes of exercise on most days of the week. Walking is a good choice. You also may want to do other activities, such as running, swimming, cycling, or playing tennis or team sports. Cut out foods that cause gas, pain, or other symptoms. When should you call for help? Call your doctor now or seek immediate medical care if:    You have belly pain. You pass maroon or very bloody stools. You have a fever. You have nausea and vomiting.      You have unusual changes in your bowel movements or abdominal swelling. You have burning pain when you urinate. You have abnormal vaginal discharge. You have shoulder pain. You have cramping pain that does not get better when you have a bowel movement or pass gas. You pass gas or stool from your urethra while urinating. Watch closely for changes in your health, and be sure to contact your doctor if you have any problems. Where can you learn more? Go to http://www.woods.com/ and enter K072 to learn more about \"Diverticulosis: Care Instructions. \"  Current as of: June 6, 2022               Content Version: 13.5  © 1256-2090 Healthwise, Incorporated. Care instructions adapted under license by [facility] ([facility]). If you have questions about a medical condition or this instruction, always ask your healthcare professional. [address] any warranty or liability for your use of this information.

## 2023-03-02 ENCOUNTER — OFFICE VISIT (OUTPATIENT)
Dept: CARDIOLOGY CLINIC | Age: 73
End: 2023-03-02
Payer: MEDICARE

## 2023-03-02 VITALS
SYSTOLIC BLOOD PRESSURE: 116 MMHG | BODY MASS INDEX: 29.72 KG/M2 | WEIGHT: 161.5 LBS | HEART RATE: 56 BPM | DIASTOLIC BLOOD PRESSURE: 60 MMHG | HEIGHT: 62 IN | OXYGEN SATURATION: 97 %

## 2023-03-02 DIAGNOSIS — R06.02 SHORTNESS OF BREATH: ICD-10-CM

## 2023-03-02 DIAGNOSIS — N28.9 RENAL INSUFFICIENCY: ICD-10-CM

## 2023-03-02 DIAGNOSIS — R94.31 ABNORMAL EKG: ICD-10-CM

## 2023-03-02 DIAGNOSIS — E78.2 MIXED HYPERLIPIDEMIA: ICD-10-CM

## 2023-03-02 DIAGNOSIS — I10 ESSENTIAL HYPERTENSION: Primary | ICD-10-CM

## 2023-03-02 DIAGNOSIS — R56.9 SEIZURE (HCC): ICD-10-CM

## 2023-03-02 DIAGNOSIS — I63.9 CEREBROVASCULAR ACCIDENT (CVA), UNSPECIFIED MECHANISM (HCC): ICD-10-CM

## 2023-03-02 DIAGNOSIS — G47.33 OBSTRUCTIVE SLEEP APNEA SYNDROME: ICD-10-CM

## 2023-03-02 PROCEDURE — 99214 OFFICE O/P EST MOD 30 MIN: CPT | Performed by: INTERNAL MEDICINE

## 2023-03-02 PROCEDURE — 3078F DIAST BP <80 MM HG: CPT | Performed by: INTERNAL MEDICINE

## 2023-03-02 PROCEDURE — 3074F SYST BP LT 130 MM HG: CPT | Performed by: INTERNAL MEDICINE

## 2023-03-02 PROCEDURE — 1123F ACP DISCUSS/DSCN MKR DOCD: CPT | Performed by: INTERNAL MEDICINE

## 2023-03-02 RX ORDER — FUROSEMIDE 20 MG/1
20 TABLET ORAL
COMMUNITY
Start: 2023-03-01

## 2023-03-02 RX ORDER — CLOTRIMAZOLE 1 G/ML
SOLUTION TOPICAL 2 TIMES DAILY
COMMUNITY

## 2023-03-02 ASSESSMENT — ENCOUNTER SYMPTOMS
ABDOMINAL DISTENTION: 0
NAUSEA: 0
PHOTOPHOBIA: 0
BLOOD IN STOOL: 0
COUGH: 0
CONSTIPATION: 0
CHEST TIGHTNESS: 0
ABDOMINAL PAIN: 0
SHORTNESS OF BREATH: 0

## 2023-03-02 NOTE — PATIENT INSTRUCTIONS
Follow up with eye doctor regarding vision problem  Call for any changes  [facility]  Follow up in one year

## 2023-03-02 NOTE — PROGRESS NOTES
[facility]  3/2/23  Referring: Dr. Mederos    \"Remy\"    REASON FOR CONSULT/CHIEF COMPLAINT/HPI     Reason for visit/ Chief complaint  Follow up  hypertension   HPI Remy Giron is a 72 y.o. seen for a 6 month follow up for hypertension managment. He has a history of severe YANICK, partial complex seizure, stroke, renal insuff, anemia, thyroid disorder. He has had injections for right shoulder pain.    He is a retired  ([employer], [employer]). He lives with his  Younger sister, and cats.  His niece (age 39)  of an MI last month.He is a nonsmoker, nondrinker.  He was seen by Dr Matos yesterday, advised to continue primidone and cpap.    In the interval since his last visit he had an echo to evaluate murmer, abnormal ekg. This showed grade one diastolic dysfunction  He  has also had a colonoscopy    Today he states he remains busy by reading the bible, doing dishes, laundry and weather stats. Does not routinely exercise. He did have an episode of double vision while driving his car. No headache, heart racing or skipping beats. This has not returned. Will make an appointment with optometrist today. He has 7 cats takes the cat litter out to the trash without chest pain. He does have some shortness of breath. Thinks he has a cold, occasional cough, no fever or chills. Slight edema.  Five years ago he was more active doing yardwork, had more energy to be active       Patient is adherent with medications and is tolerating them well without side effects     HISTORY/ALLERGIES/ROS     MedHx:  has a past medical history of Cerebral artery occlusion with cerebral infarction (HCC), Hyperlipidemia, Hypothyroidism, Seizures (HCC), and Sleep apnea.  SurgHx:  has a past surgical history that includes Colonoscopy (N/A, 2023).   SocHx:  reports that he has never smoked. He has never used smokeless tobacco. He reports that he does not drink alcohol and does not use drugs.   FamHx: No  family history of premature coronary artery disease, sudden death, or aneurysm  Allergies: Linaclotide   ROS:   Review of Systems   Constitutional:  Negative for activity change, diaphoresis, fatigue and fever. HENT:  Negative for congestion and ear discharge. Eyes:  Positive for visual disturbance. Negative for photophobia. Respiratory:  Negative for cough, chest tightness and shortness of breath. Cardiovascular:  Negative for chest pain and palpitations. Gastrointestinal:  Negative for abdominal distention, abdominal pain, blood in stool, constipation and nausea. Endocrine: Negative for cold intolerance and polydipsia. Genitourinary:  Negative for difficulty urinating and flank pain. Musculoskeletal:  Positive for arthralgias and myalgias. Skin:  Negative for rash and wound. Allergic/Immunologic: Negative for environmental allergies and immunocompromised state. Neurological:  Negative for dizziness and headaches. Hematological:  Negative for adenopathy. Does not bruise/bleed easily. Psychiatric/Behavioral:  Negative for confusion. The patient is not hyperactive. MEDICATIONS      Prior to Admission medications    Medication Sig Start Date End Date Taking? Authorizing Provider   furosemide (LASIX) 20 MG tablet Take 20 mg by mouth Every day except Solo 3/1/23  Yes Historical Provider, MD   dapagliflozin (FARXIGA) 10 MG tablet Take 10 mg by mouth every morning   Yes Historical Provider, MD   clotrimazole (LOTRIMIN) 1 % external solution Apply topically 2 times daily Apply topically 2 times daily.    Yes Historical Provider, MD   Finerenone 10 MG TABS Take 10 mg by mouth   Yes Historical Provider, MD   mupirocin (BACTROBAN) 2 % nasal ointment by Nasal route 2 times daily Apply to right toe   Yes Historical Provider, MD   primidone (MYSOLINE) 250 MG tablet TAKE 1 TABLET BY MOUTH TWICE A DAY 12/16/22  Yes Sundar Leslie MD   fluticasone (FLONASE) 50 MCG/ACT nasal spray 1 spray by Each Nostril route daily  Patient taking differently: 1 spray by Each Nostril route in the morning and at bedtime 7/6/22  Yes Faraz Ji MD   nystatin (MYCOSTATIN) [id number] UNIT/GM cream APPLY 1 APPLICATION TOPICALLY ON THE SKIN TWICE A DAY 6/15/22  Yes Historical Provider, MD   amLODIPine (NORVASC) 5 MG tablet Take 10 mg by mouth daily    Yes Historical Provider, MD   atorvastatin (LIPITOR) 80 MG tablet Take 80 mg by mouth daily   Yes Historical Provider, MD   IRBESARTAN PO Take 300 mg by mouth daily    Yes Historical Provider, MD   Multiple Vitamin (MULTI-VITAMIN DAILY PO) Take by mouth   Yes Historical Provider, MD   clopidogrel (PLAVIX) 75 MG tablet TK 1 T PO QD 9/24/17  Yes Historical Provider, MD   aspirin 81 MG tablet Take 81 mg by mouth daily   Yes Historical Provider, MD   LEVOTHYROXINE SODIUM 50 mcg by Does not apply route daily    Yes Historical Provider, MD   Omega-3-acid Ethyl Esters (LOVAZA PO) Take by mouth daily (with breakfast)   Yes Historical Provider, MD       PHYSICAL EXAM        Vitals:    03/02/23 1451   BP: 116/60   Pulse: 56   SpO2: 97%    Weight: 161 lb 8 oz (73.3 kg)     Gen Alert, cooperative, no distress Heart  Regular rate and rhythm, 1/6 systolic murmur   Head Normocephalic, atraumatic, no abnormalities Abd  Soft, NT, +BS, no mass, no OM   Eyes PERRLA, conj/corn clear Ext  Ext nl, AT, no C/C, no edema   Nose Nares normal, no drain age, Non-tender Pulse 2+ and symmetric   Throat Lips, mucosa, tongue normal Skin Color/text/turg nl, no rash/lesions   Neck S/S, TM, NT, no bruit Psych Nl mood and affect   Lung CTA-B, unlabored, no DTP     Ch wall NT, no deform       LABS and Imaging     Relevant and available CV data reviewed  Echo/MRI:11/3/22- Technically difficult study due to body habitus. Left ventricular cavity size is normal with moderate concentric left  ventricular hypertrophy. Ejection fraction is visually estimated to be 65-70%. No regional wall  motion abnormalities are noted. Grade I diastolic dysfunction with normal LV filling pressures. E/e' = 9.1. Trivial tricuspid regurgitation. RVSP - 19 mmHG. Cath: none  Holter:none  EKG personally interpreted: 12/29  Sinus gilberto, prior anterior infarct, lafb,   Stress:none  Moderate Risk  Moderate Complexity/Medical Decision Making  Outside/Care everywhere records Reviewed  Labs Reviewed  Prior Imaging, ekg,  reviewed when available  Medications reviewed  Old Notes reviewed  ASSESSMENT AND PLAN     1. Essential hypertension  -116/60  - stable  Plan:  - management per PCP  - irbesartan 300 mg and norvasc 10 mg daily      2. Abnormal EKG and systolic murmur  -68/7/35- echo diastolic dysfunction, grade 1  - stable  Plan:  -  stress test to evaluate for ischemia given shortness of breath     2. YANICK  -severe  Plan:  -recommend daily use of cpap    3.seizure  -partial complex seizure  -stable  Plan:  -continue with primidone 250 mg bid  -followed by Dr Chandrakant Swanson    4. Anemia  -  10/29/2021  Folate greater than 44.6  -  1/26/23  11.4/34.7      5. Renal insufficiency  8/11/2022 Cr 1.5 eGFR 46 UPC 0.1  1/26/2023 Cr 1.5 eGFR 51   - stage 3a CKD  Plan  - no NSAID  - followed by Dr Kerry Guadarrama    6. cva  - diagnosed in 2012- \"mini stroke\"  Plan  - on plavix per pcp    7. Thyroid disorder  - 2/2023 ptc 132.8  Plan  - on levothyroxine    8. Mixed Hyperlipidemia  - 10/29/21  tc 168 tri 125 hdl 43 ldl 100  Plan  - on lipitor 80 mg daily    9. Cognitive impairment? Post-stroke? Seizure disorder    10 double vision  - one episode  - new problem  Plan  - follow up with eye doctor     Patient counseled on lifestyle modification, diet, and exercise.     Follow Up:   12months      Scribe Attestation:  Santana Rojas, am scribing for and in the presence of Dalia Stephenson MD.   Angel, Rajesh Cardenas 03/02/23 3:08 PM

## 2023-03-02 NOTE — LETTER
March 9, 2023      David Joseph, [address]      Patient: Mallory Ramirez   MR Number: [id number]   YOB: 1950   Date of Visit: 3/2/2023       Dear David Joseph: Thank you for referring Mallory Ramirez to me for evaluation/treatment. Below are the relevant portions of my assessment and plan of care. If you have questions, please do not hesitate to call me. I look forward to following Megan Khalil along with you.     Sincerely,        Ashley Huber, DO

## 2023-03-14 ENCOUNTER — HOSPITAL ENCOUNTER (OUTPATIENT)
Dept: NON INVASIVE DIAGNOSTICS | Age: 73
Discharge: HOME | End: 2023-03-14
Payer: MEDICARE

## 2023-03-14 DIAGNOSIS — R06.02 SHORTNESS OF BREATH: ICD-10-CM

## 2023-03-14 DIAGNOSIS — R94.31 ABNORMAL EKG: ICD-10-CM

## 2023-03-14 LAB
LV EF: 59 %
LVEF MODALITY: NORMAL

## 2023-03-14 PROCEDURE — 93017 CV STRESS TEST TRACING ONLY: CPT | Performed by: INTERNAL MEDICINE

## 2023-03-14 PROCEDURE — A9502 TC99M TETROFOSMIN: HCPCS | Performed by: INTERNAL MEDICINE

## 2023-03-14 PROCEDURE — 78452 HT MUSCLE IMAGE SPECT MULT: CPT | Performed by: INTERNAL MEDICINE

## 2023-03-14 PROCEDURE — 3430000000 HC RX DIAGNOSTIC RADIOPHARMACEUTICAL: Performed by: INTERNAL MEDICINE

## 2023-03-14 RX ADMIN — TETROFOSMIN 30 MILLICURIE: 1.38 INJECTION, POWDER, LYOPHILIZED, FOR SOLUTION INTRAVENOUS at 13:42

## 2023-03-14 RX ADMIN — TETROFOSMIN 10 MILLICURIE: 1.38 INJECTION, POWDER, LYOPHILIZED, FOR SOLUTION INTRAVENOUS at 12:35

## 2023-03-14 NOTE — PROGRESS NOTES
Patient instructed on Chan Protocol Stress Test Procedure including possible side effects and adverse reactions. Verbalizes knowledge and understanding and denies having any questions.

## 2023-03-23 ENCOUNTER — TELEPHONE (OUTPATIENT)
Dept: CARDIOLOGY CLINIC | Age: 73
End: 2023-03-23

## 2023-03-23 NOTE — TELEPHONE ENCOUNTER
[facility] called in stating that the pt needs to be scheduled for a CT Scan and also for Hydration before   . [facility]per[facility] states that they do not schedule th CT Scan so she is requesting a call back from [facility] so that the can get the CT Scan and Hydration both scheduled same day.     Esperanza can be reached at [phone]

## 2023-03-27 ENCOUNTER — TELEPHONE (OUTPATIENT)
Dept: CARDIOLOGY CLINIC | Age: 73
End: 2023-03-27

## 2023-03-27 DIAGNOSIS — R94.39 ABNORMAL CARDIOVASCULAR STRESS TEST: Primary | ICD-10-CM

## 2023-03-27 NOTE — TELEPHONE ENCOUNTER
Called and discussed with [facility]-  they can do infusion April 7 at 930.   This is ok with patient  Called central scheduling, discussed with Nikky Saravia- she will reach out with radiology to see if they can do it then and get back to me, then will call infusion center to be sure that it all is scheduled together, Patient will be instructed to take metoprolol as recommended

## 2023-03-27 NOTE — TELEPHONE ENCOUNTER
June Guerrero called back states they can do CTA on 4/7. She said to call he to schedule.  Alysa's direct number is [phone]

## 2023-03-27 NOTE — TELEPHONE ENCOUNTER
Coronary cta scheduled on April 7 at 2 pm. Left message at infusion center to call back if it is ok for him to come in 4 hours earlier for fluids.  Once this is confirmed, will call patient and give him all the instructions, including metoprolol

## 2023-03-27 NOTE — TELEPHONE ENCOUNTER
Called patient, he can no longer make it on April 7. Will notify central scheduling and infusion center.     Patient is available any other day but April 7

## 2023-03-29 ENCOUNTER — TELEPHONE (OUTPATIENT)
Dept: CARDIOLOGY CLINIC | Age: 73
End: 2023-03-29

## 2023-03-29 NOTE — TELEPHONE ENCOUNTER
Spoke with patient advise him how to take Metoprolol  Sig: Take 1 tablet by mouth See Admin Instructions Take one the night prior cta, second dose one hour prior cta         Voiced understanding.

## 2023-03-29 NOTE — TELEPHONE ENCOUNTER
Pt called to ask the office why is dkw is prescribing  the med metoprolol tartrate 25mg.   Please advise

## 2023-04-07 ENCOUNTER — HOSPITAL ENCOUNTER (OUTPATIENT)
Dept: ONCOLOGY | Age: 73
Setting detail: INFUSION SERIES
Discharge: HOME | End: 2023-04-07
Attending: INTERNAL MEDICINE | Admitting: INTERNAL MEDICINE
Payer: MEDICARE

## 2023-04-07 ENCOUNTER — HOSPITAL ENCOUNTER (OUTPATIENT)
Dept: CT IMAGING | Age: 73
Discharge: HOME | End: 2023-04-07
Payer: MEDICARE

## 2023-04-07 VITALS
BODY MASS INDEX: 28.71 KG/M2 | TEMPERATURE: 98 F | HEIGHT: 62 IN | DIASTOLIC BLOOD PRESSURE: 57 MMHG | HEART RATE: 46 BPM | SYSTOLIC BLOOD PRESSURE: 166 MMHG | OXYGEN SATURATION: 100 % | WEIGHT: 156 LBS | RESPIRATION RATE: 18 BRPM

## 2023-04-07 VITALS
SYSTOLIC BLOOD PRESSURE: 156 MMHG | TEMPERATURE: 96.9 F | DIASTOLIC BLOOD PRESSURE: 65 MMHG | RESPIRATION RATE: 16 BRPM | HEART RATE: 48 BPM

## 2023-04-07 DIAGNOSIS — R94.39 ABNORMAL CARDIOVASCULAR STRESS TEST: ICD-10-CM

## 2023-04-07 LAB
BUN SERPL-MCNC: 32 MG/DL (ref 7–20)
CREAT SERPL-MCNC: 1.6 MG/DL (ref 0.8–1.3)
GFR SERPLBLD CREATININE-BSD FMLA CKD-EPI: 45 ML/MIN/{1.73_M2}

## 2023-04-07 PROCEDURE — 96360 HYDRATION IV INFUSION INIT: CPT

## 2023-04-07 PROCEDURE — 2580000003 HC RX 258: Performed by: INTERNAL MEDICINE

## 2023-04-07 PROCEDURE — 75574 CT ANGIO HRT W/3D IMAGE: CPT

## 2023-04-07 PROCEDURE — 6360000004 HC RX CONTRAST MEDICATION: Performed by: INTERNAL MEDICINE

## 2023-04-07 PROCEDURE — 84520 ASSAY OF UREA NITROGEN: CPT

## 2023-04-07 PROCEDURE — 96361 HYDRATE IV INFUSION ADD-ON: CPT

## 2023-04-07 PROCEDURE — 6370000000 HC RX 637 (ALT 250 FOR IP): Performed by: STUDENT IN AN ORGANIZED HEALTH CARE EDUCATION/TRAINING PROGRAM

## 2023-04-07 PROCEDURE — 99211 OFF/OP EST MAY X REQ PHY/QHP: CPT

## 2023-04-07 PROCEDURE — 82565 ASSAY OF CREATININE: CPT

## 2023-04-07 RX ORDER — SODIUM CHLORIDE 0.9 % (FLUSH) 0.9 %
5-40 SYRINGE (ML) INJECTION ONCE
Status: COMPLETED | OUTPATIENT
Start: 2023-04-07 | End: 2023-04-07

## 2023-04-07 RX ORDER — SODIUM CHLORIDE 9 MG/ML
INJECTION, SOLUTION INTRAVENOUS CONTINUOUS
Status: ACTIVE | OUTPATIENT
Start: 2023-04-07 | End: 2023-04-07

## 2023-04-07 RX ORDER — NITROGLYCERIN 0.4 MG/1
0.4 TABLET SUBLINGUAL ONCE
Status: COMPLETED | OUTPATIENT
Start: 2023-04-07 | End: 2023-04-07

## 2023-04-07 RX ADMIN — IOPAMIDOL 75 ML: 755 INJECTION, SOLUTION INTRAVENOUS at 15:00

## 2023-04-07 RX ADMIN — Medication 10 ML: at 10:22

## 2023-04-07 RX ADMIN — SODIUM CHLORIDE: 9 INJECTION, SOLUTION INTRAVENOUS at 10:22

## 2023-04-07 RX ADMIN — NITROGLYCERIN 0.4 MG: 0.4 TABLET, ORALLY DISINTEGRATING SUBLINGUAL at 15:13

## 2023-04-07 ASSESSMENT — PAIN - FUNCTIONAL ASSESSMENT: PAIN_FUNCTIONAL_ASSESSMENT: NONE - DENIES PAIN

## 2023-04-07 NOTE — PROGRESS NOTES
Pt to Dept  for Pre-hydration for CTA scan. #18 gauge placed Right AC per CT scan request. Labs drawn with hydration started at 200 ml/hr for 4 hours. Heart rate at 48 at 1330,Lopressor not given. Patient taken to registration per W/C for scheduled CTA

## 2023-04-07 NOTE — PROGRESS NOTES
Patient tolerated scan without problems. /52, HR 47. Patient discharged home with belongings and paperwork.

## 2023-04-18 ENCOUNTER — TELEPHONE (OUTPATIENT)
Dept: CARDIOLOGY CLINIC | Age: 73
End: 2023-04-18

## 2023-06-13 ENCOUNTER — TELEPHONE (OUTPATIENT)
Dept: CARDIOLOGY CLINIC | Age: 73
End: 2023-06-13

## 2023-08-09 ENCOUNTER — HOSPITAL ENCOUNTER (OUTPATIENT)
Age: 73
Discharge: HOME | End: 2023-08-09
Payer: MEDICARE

## 2023-08-09 LAB
25(OH)D3 SERPL-MCNC: 48.5 NG/ML
ALBUMIN SERPL-MCNC: 4.3 G/DL (ref 3.4–5)
ANION GAP SERPL CALCULATED.3IONS-SCNC: 13 MMOL/L (ref 3–16)
BUN SERPL-MCNC: 35 MG/DL (ref 7–20)
CALCIUM SERPL-MCNC: 8.6 MG/DL (ref 8.3–10.6)
CHLORIDE SERPL-SCNC: 98 MMOL/L (ref 99–110)
CO2 SERPL-SCNC: 23 MMOL/L (ref 21–32)
CREAT SERPL-MCNC: 1.7 MG/DL (ref 0.8–1.3)
DEPRECATED RDW RBC AUTO: 13.7 % (ref 12.4–15.4)
GFR SERPLBLD CREATININE-BSD FMLA CKD-EPI: 42 ML/MIN/{1.73_M2}
GLUCOSE SERPL-MCNC: 99 MG/DL (ref 70–99)
HCT VFR BLD AUTO: 31.1 % (ref 40.5–52.5)
HGB BLD-MCNC: 10.5 G/DL (ref 13.5–17.5)
MCH RBC QN AUTO: 31.8 PG (ref 26–34)
MCHC RBC AUTO-ENTMCNC: 33.8 G/DL (ref 31–36)
MCV RBC AUTO: 93.9 FL (ref 80–100)
PHOSPHATE SERPL-MCNC: 3.7 MG/DL (ref 2.5–4.9)
PLATELET # BLD AUTO: 222 K/UL (ref 135–450)
PMV BLD AUTO: 9.8 FL (ref 5–10.5)
POTASSIUM SERPL-SCNC: 5.4 MMOL/L (ref 3.5–5.1)
PTH-INTACT SERPL-MCNC: 170 PG/ML (ref 14–72)
RBC # BLD AUTO: 3.31 M/UL (ref 4.2–5.9)
SODIUM SERPL-SCNC: 134 MMOL/L (ref 136–145)
WBC # BLD AUTO: 5.3 K/UL (ref 4–11)

## 2023-08-09 PROCEDURE — 80069 RENAL FUNCTION PANEL: CPT

## 2023-08-09 PROCEDURE — 83970 ASSAY OF PARATHORMONE: CPT

## 2023-08-09 PROCEDURE — 85027 COMPLETE CBC AUTOMATED: CPT

## 2023-08-09 PROCEDURE — 36415 COLL VENOUS BLD VENIPUNCTURE: CPT

## 2023-08-09 PROCEDURE — 82306 VITAMIN D 25 HYDROXY: CPT

## 2023-09-01 ASSESSMENT — ENCOUNTER SYMPTOMS
ABDOMINAL DISTENTION: 0
NAUSEA: 0
SHORTNESS OF BREATH: 0
CHEST TIGHTNESS: 0
COUGH: 0
BLOOD IN STOOL: 0
ABDOMINAL PAIN: 0
CONSTIPATION: 0
PHOTOPHOBIA: 0

## 2023-09-01 NOTE — PROGRESS NOTES
[address] [place] [address]  23  Referring: Dr. Althea Blackburn    \"Remy\"    REASON FOR CONSULT/CHIEF COMPLAINT/HPI     Reason for visit/ Chief complaint  Follow up  hypertension   HPI Sejal Real is a 75 y.o. seen for follow up for hypertension management and coronary CTA. He has a history of severe YANICK, partial complex seizure, stroke, renal insuff, anemia, thyroid disorder. He has had injections for right shoulder pain. He is a retired  ([employer], [address] [place] [address], [email or web address]). He lives with his younger sister and cats. His niece (age 44)  of an MI recently. He is a nonsmoker, nondrinker. He was seen by Dr Matthew Reina, advised to continue primidone and cpap. Echo 22 showed grade one diastolic dysfunction  He has also had a colonoscopy. He had coronary cardiac CTA 23. Today, Mr Matthew Andino is doing well. He is tired. He watched a lot of [place]  and he missed some sleep. He lives with his sister and she is doing well, she was recently in the hospital 2x in a month for breathing problems. He mowed the yard, racking the grass, and had to get all the weeds out of the basketball court. He was working for 5 hrs and was able to do it all with no problem. He can walk \"forever\". He is eating healthy but continues to gain weight. They get meals on wheels delivered. Patient is adherent with medications and is tolerating them well without side effects     HISTORY/ALLERGIES/ROS     MedHx:  has a past medical history of Cerebral artery occlusion with cerebral infarction ([address]), Hyperlipidemia, Hypothyroidism, Seizures ([address]), and Sleep apnea. SurgHx:  has a past surgical history that includes Colonoscopy (N/A, 2023). SocHx:  reports that he has never smoked. He has never used smokeless tobacco. He reports that he does not drink alcohol and does not use drugs.    FamHx: No family history of premature coronary artery disease, sudden death, or

## 2023-09-07 PROBLEM — I25.10 CORONARY ARTERY DISEASE INVOLVING NATIVE CORONARY ARTERY OF NATIVE HEART WITHOUT ANGINA PECTORIS: Status: ACTIVE | Noted: 2023-09-07

## 2023-09-12 ENCOUNTER — OFFICE VISIT (OUTPATIENT)
Dept: CARDIOLOGY CLINIC | Age: 73
End: 2023-09-12

## 2023-09-12 VITALS
BODY MASS INDEX: 32.45 KG/M2 | OXYGEN SATURATION: 95 % | SYSTOLIC BLOOD PRESSURE: 102 MMHG | HEART RATE: 47 BPM | HEIGHT: 60 IN | WEIGHT: 165.3 LBS | DIASTOLIC BLOOD PRESSURE: 56 MMHG

## 2023-09-12 DIAGNOSIS — E78.2 MIXED HYPERLIPIDEMIA: ICD-10-CM

## 2023-09-12 DIAGNOSIS — I25.10 CORONARY ARTERY DISEASE INVOLVING NATIVE CORONARY ARTERY OF NATIVE HEART WITHOUT ANGINA PECTORIS: ICD-10-CM

## 2023-09-12 DIAGNOSIS — I10 ESSENTIAL HYPERTENSION: Primary | ICD-10-CM

## 2023-09-12 RX ORDER — IRBESARTAN 150 MG/1
150 TABLET ORAL DAILY
Qty: 90 TABLET | Refills: 3
Start: 2023-09-12

## 2023-09-12 NOTE — PATIENT INSTRUCTIONS
Follow up with Dr Palmira Baldwin in 6 months     Make sure you are only taking Irbesartan 150 mg daily. Speak to your PCP about the Omega3 fish oil and whether you need to take this or not. If you do, ask if you can take an Over the Counter one instead due to cost.     Call for any questions or concerns.

## 2023-09-19 RX ORDER — IRBESARTAN 150 MG/1
150 TABLET ORAL DAILY
Qty: 90 TABLET | Refills: 3 | Status: SHIPPED | OUTPATIENT
Start: 2023-09-19

## 2023-09-19 NOTE — TELEPHONE ENCOUNTER
Please send script for irbesartan (AVAPRO) 150 MG tablet written on 9/12 to [address], [address] [facility]

## 2023-09-22 ENCOUNTER — TELEPHONE (OUTPATIENT)
Dept: CARDIOLOGY CLINIC | Age: 73
End: 2023-09-22

## 2023-09-22 NOTE — TELEPHONE ENCOUNTER
Spoke to PT about concern for correct prescription at [facility]. I did advise him that [facility] sent a new script on 09/19 for the irbesartan (AVAPRO) 150 MG tablet to [facility].   PT confirmed understanding.

## 2023-11-07 ENCOUNTER — TELEPHONE (OUTPATIENT)
Dept: CARDIOLOGY CLINIC | Age: 73
End: 2023-11-07

## 2023-12-14 ENCOUNTER — OFFICE VISIT (OUTPATIENT)
Dept: NEUROLOGY | Age: 73
End: 2023-12-14
Payer: MEDICARE

## 2023-12-14 VITALS
HEART RATE: 54 BPM | WEIGHT: 165 LBS | HEIGHT: 60 IN | BODY MASS INDEX: 32.39 KG/M2 | SYSTOLIC BLOOD PRESSURE: 131 MMHG | DIASTOLIC BLOOD PRESSURE: 66 MMHG

## 2023-12-14 DIAGNOSIS — G40.209 PARTIAL EPILEPSY WITH IMPAIRMENT OF CONSCIOUSNESS (HCC): ICD-10-CM

## 2023-12-14 PROCEDURE — 3075F SYST BP GE 130 - 139MM HG: CPT | Performed by: PSYCHIATRY & NEUROLOGY

## 2023-12-14 PROCEDURE — 3078F DIAST BP <80 MM HG: CPT | Performed by: PSYCHIATRY & NEUROLOGY

## 2023-12-14 PROCEDURE — 1123F ACP DISCUSS/DSCN MKR DOCD: CPT | Performed by: PSYCHIATRY & NEUROLOGY

## 2023-12-14 PROCEDURE — 99213 OFFICE O/P EST LOW 20 MIN: CPT | Performed by: PSYCHIATRY & NEUROLOGY

## 2023-12-14 RX ORDER — PRIMIDONE 250 MG/1
TABLET ORAL
Qty: 180 TABLET | Refills: 1 | Status: SHIPPED | OUTPATIENT
Start: 2023-12-14
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
## 2024-02-08 ENCOUNTER — HOSPITAL ENCOUNTER (OUTPATIENT)
Age: 74
Discharge: HOME | End: 2024-02-08
Payer: MEDICARE

## 2024-02-08 LAB
25(OH)D3 SERPL-MCNC: 41.5 NG/ML
ALBUMIN SERPL-MCNC: 4.5 G/DL (ref 3.4–5)
ALBUMIN/GLOB SERPL: 1.7 {RATIO} (ref 1.1–2.2)
ALP SERPL-CCNC: 150 U/L (ref 40–129)
ALT SERPL-CCNC: 21 U/L (ref 10–40)
ANION GAP SERPL CALCULATED.3IONS-SCNC: 14 MMOL/L (ref 3–16)
AST SERPL-CCNC: 21 U/L (ref 15–37)
BACTERIA URNS QL MICRO: NORMAL /HPF
BILIRUB SERPL-MCNC: <0.2 MG/DL (ref 0–1)
BILIRUB UR QL STRIP.AUTO: NEGATIVE
BUN SERPL-MCNC: 27 MG/DL (ref 7–20)
CALCIUM SERPL-MCNC: 8.2 MG/DL (ref 8.3–10.6)
CHLORIDE SERPL-SCNC: 98 MMOL/L (ref 99–110)
CHOLEST SERPL-MCNC: 178 MG/DL (ref 0–199)
CLARITY UR: CLEAR
CO2 SERPL-SCNC: 25 MMOL/L (ref 21–32)
COLOR UR: YELLOW
CREAT SERPL-MCNC: 1.4 MG/DL (ref 0.8–1.3)
DEPRECATED RDW RBC AUTO: 13.4 % (ref 12.4–15.4)
EPI CELLS #/AREA URNS AUTO: 0 /HPF (ref 0–5)
GFR SERPLBLD CREATININE-BSD FMLA CKD-EPI: 52 ML/MIN/{1.73_M2}
GLUCOSE SERPL-MCNC: 97 MG/DL (ref 70–99)
GLUCOSE UR STRIP.AUTO-MCNC: NEGATIVE MG/DL
HCT VFR BLD AUTO: 32 % (ref 40.5–52.5)
HDLC SERPL-MCNC: 60 MG/DL (ref 40–60)
HGB BLD-MCNC: 10.6 G/DL (ref 13.5–17.5)
HGB UR QL STRIP.AUTO: NEGATIVE
HYALINE CASTS #/AREA URNS AUTO: 0 /LPF (ref 0–8)
KETONES UR STRIP.AUTO-MCNC: NEGATIVE MG/DL
LDLC SERPL CALC-MCNC: 97 MG/DL
LEUKOCYTE ESTERASE UR QL STRIP.AUTO: NEGATIVE
MCH RBC QN AUTO: 32 PG (ref 26–34)
MCHC RBC AUTO-ENTMCNC: 33.2 G/DL (ref 31–36)
MCV RBC AUTO: 96.4 FL (ref 80–100)
NITRITE UR QL STRIP.AUTO: NEGATIVE
PH UR STRIP.AUTO: 5 [PH] (ref 5–8)
PHOSPHATE SERPL-MCNC: 3.1 MG/DL (ref 2.5–4.9)
PLATELET # BLD AUTO: 244 K/UL (ref 135–450)
PMV BLD AUTO: 9.2 FL (ref 5–10.5)
POTASSIUM SERPL-SCNC: 4.7 MMOL/L (ref 3.5–5.1)
PROT SERPL-MCNC: 7.2 G/DL (ref 6.4–8.2)
PROT UR STRIP.AUTO-MCNC: 30 MG/DL
PSA SERPL DL<=0.01 NG/ML-MCNC: 0.36 NG/ML (ref 0–4)
PTH-INTACT SERPL-MCNC: 124.3 PG/ML (ref 14–72)
RBC # BLD AUTO: 3.32 M/UL (ref 4.2–5.9)
RBC CLUMPS #/AREA URNS AUTO: 0 /HPF (ref 0–4)
SODIUM SERPL-SCNC: 137 MMOL/L (ref 136–145)
SP GR UR STRIP.AUTO: 1.02 (ref 1–1.03)
TRIGL SERPL-MCNC: 106 MG/DL (ref 0–150)
TSH SERPL DL<=0.005 MIU/L-ACNC: 2.73 UIU/ML (ref 0.27–4.2)
UA DIPSTICK W REFLEX MICRO PNL UR: YES
URN SPEC COLLECT METH UR: ABNORMAL
UROBILINOGEN UR STRIP-ACNC: 0.2 E.U./DL
VLDLC SERPL CALC-MCNC: 21 MG/DL
WBC # BLD AUTO: 5.4 K/UL (ref 4–11)
WBC #/AREA URNS AUTO: 0 /HPF (ref 0–5)

## 2024-02-08 PROCEDURE — 84153 ASSAY OF PSA TOTAL: CPT

## 2024-02-08 PROCEDURE — 84100 ASSAY OF PHOSPHORUS: CPT

## 2024-02-08 PROCEDURE — 82306 VITAMIN D 25 HYDROXY: CPT

## 2024-02-08 PROCEDURE — 84443 ASSAY THYROID STIM HORMONE: CPT

## 2024-02-08 PROCEDURE — 80053 COMPREHEN METABOLIC PANEL: CPT

## 2024-02-08 PROCEDURE — 81001 URINALYSIS AUTO W/SCOPE: CPT

## 2024-02-08 PROCEDURE — 80061 LIPID PANEL: CPT

## 2024-02-08 PROCEDURE — 85027 COMPLETE CBC AUTOMATED: CPT

## 2024-02-08 PROCEDURE — 83970 ASSAY OF PARATHORMONE: CPT

## 2024-02-08 PROCEDURE — 36415 COLL VENOUS BLD VENIPUNCTURE: CPT

## 2024-04-01 ENCOUNTER — CLINICAL DOCUMENTATION (OUTPATIENT)
Dept: CARDIOLOGY CLINIC | Age: 74
End: 2024-04-01

## 2024-05-23 NOTE — PROGRESS NOTES
Multiple Vitamin (MULTI-VITAMIN DAILY PO) Take by mouth   Yes Zaida Edwards MD   clopidogrel (PLAVIX) 75 MG tablet TK 1 T PO QD 9/24/17  Yes Zaida Edwards MD   LEVOTHYROXINE SODIUM 50 mcg by Does not apply route daily    Yes Zaida Edwards MD   Omega-3-acid Ethyl Esters (LOVAZA PO) Take by mouth daily (with breakfast)   Yes Zaida Edwards MD   irbesartan (AVAPRO) 150 MG tablet Take 1 tablet by mouth daily  Patient not taking: Reported on 5/30/2024 9/19/23   Dewayne Gonzalez DO   dapagliflozin (FARXIGA) 10 MG tablet Take 1 tablet by mouth every morning  Patient not taking: Reported on 12/14/2023    Zaida Edwards MD   clotrimazole (LOTRIMIN) 1 % external solution Apply topically 2 times daily Apply topically 2 times daily.  Patient not taking: Reported on 12/14/2023    Zaida Edwards MD   Finerenone 10 MG TABS Take 10 mg by mouth  Patient not taking: Reported on 12/14/2023    Zaida Edwards MD       PHYSICAL EXAM        Vitals:    05/30/24 1426   BP: 118/60   Pulse: (!) 49   SpO2: 95%      Weight - Scale: 72.6 kg (160 lb 1.6 oz)     Gen Alert, cooperative, no distress Heart  Regular rate and rhythm, 1/6 systolic murmur   Head Normocephalic, atraumatic, no abnormalities Abd  Soft, NT, +BS, no mass, no OM   Eyes PERRLA, conj/corn clear Ext  Ext nl, AT, no C/C, no edema   Nose Nares normal, no drain age, Non-tender Pulse 2+ and symmetric   Throat Lips, mucosa, tongue normal Skin Color/text/turg nl, no rash/lesions   Neck S/S, TM, NT, no bruit Psych Nl mood and affect   Lung CTA-B, unlabored, no DTP     Ch wall NT, no deform       LABS and Imaging     Relevant and available CV data reviewed  Echo/MRI:11/3/22- Technically difficult study due to body habitus. Left ventricular cavity size is normal with moderate concentric left  ventricular hypertrophy. Ejection fraction is visually estimated to be 65-70%. No regional wall  motion abnormalities are noted.  Grade I

## 2024-05-23 NOTE — PATIENT INSTRUCTIONS
Continue all medications  Call [facility] services and ask where the closest [facility] is--[phone]    [facility]- : [phone]   Recommend daily exercise and walking

## 2024-05-30 ENCOUNTER — OFFICE VISIT (OUTPATIENT)
Dept: CARDIOLOGY CLINIC | Age: 74
End: 2024-05-30

## 2024-05-30 VITALS
BODY MASS INDEX: 31.43 KG/M2 | HEART RATE: 49 BPM | WEIGHT: 160.1 LBS | SYSTOLIC BLOOD PRESSURE: 118 MMHG | DIASTOLIC BLOOD PRESSURE: 60 MMHG | HEIGHT: 60 IN | OXYGEN SATURATION: 95 %

## 2024-05-30 DIAGNOSIS — R06.02 SHORTNESS OF BREATH: ICD-10-CM

## 2024-05-30 DIAGNOSIS — E78.2 MIXED HYPERLIPIDEMIA: ICD-10-CM

## 2024-05-30 DIAGNOSIS — I25.10 CORONARY ARTERY DISEASE INVOLVING NATIVE CORONARY ARTERY OF NATIVE HEART WITHOUT ANGINA PECTORIS: Primary | ICD-10-CM

## 2024-05-30 DIAGNOSIS — G47.33 OBSTRUCTIVE SLEEP APNEA SYNDROME: ICD-10-CM

## 2024-05-30 DIAGNOSIS — I10 ESSENTIAL HYPERTENSION: ICD-10-CM

## 2024-05-30 RX ORDER — ISOSORBIDE MONONITRATE 30 MG/1
15 TABLET, EXTENDED RELEASE ORAL DAILY
Qty: 90 TABLET | Refills: 3 | Status: SHIPPED | OUTPATIENT
Start: 2024-05-30

## 2024-05-30 RX ORDER — TAMSULOSIN HYDROCHLORIDE 0.4 MG/1
0.4 CAPSULE ORAL 2 TIMES DAILY
COMMUNITY

## 2024-06-27 ENCOUNTER — OFFICE VISIT (OUTPATIENT)
Dept: NEUROLOGY | Age: 74
End: 2024-06-27
Payer: MEDICARE

## 2024-06-27 VITALS
HEART RATE: 54 BPM | WEIGHT: 154 LBS | SYSTOLIC BLOOD PRESSURE: 130 MMHG | DIASTOLIC BLOOD PRESSURE: 58 MMHG | BODY MASS INDEX: 30.23 KG/M2 | HEIGHT: 60 IN

## 2024-06-27 DIAGNOSIS — G40.209 PARTIAL EPILEPSY WITH IMPAIRMENT OF CONSCIOUSNESS (HCC): Primary | ICD-10-CM

## 2024-06-27 DIAGNOSIS — E07.9 THYROID DISORDER: ICD-10-CM

## 2024-06-27 PROCEDURE — 99213 OFFICE O/P EST LOW 20 MIN: CPT | Performed by: PSYCHIATRY & NEUROLOGY

## 2024-06-27 PROCEDURE — 1123F ACP DISCUSS/DSCN MKR DOCD: CPT | Performed by: PSYCHIATRY & NEUROLOGY

## 2024-06-27 PROCEDURE — 3075F SYST BP GE 130 - 139MM HG: CPT | Performed by: PSYCHIATRY & NEUROLOGY

## 2024-06-27 PROCEDURE — 3078F DIAST BP <80 MM HG: CPT | Performed by: PSYCHIATRY & NEUROLOGY

## 2024-06-27 NOTE — PATIENT INSTRUCTIONS

## 2024-06-27 NOTE — PROGRESS NOTES
when needed  He is not interested in stopping such medication  Discussed side effect in details  Seizure precaution and blood pressure control on current doses  Continue Synthroid and follow TFT  6-month follow-up

## 2024-08-27 DIAGNOSIS — G40.209 PARTIAL EPILEPSY WITH IMPAIRMENT OF CONSCIOUSNESS (HCC): ICD-10-CM

## 2024-08-27 RX ORDER — PRIMIDONE 250 MG/1
TABLET ORAL
Qty: 180 TABLET | Refills: 1 | Status: SHIPPED | OUTPATIENT
Start: 2024-08-27

## 2024-10-30 ENCOUNTER — OFFICE VISIT (OUTPATIENT)
Dept: NEUROLOGY | Age: 74
End: 2024-10-30

## 2024-10-30 ENCOUNTER — TELEPHONE (OUTPATIENT)
Dept: NEUROLOGY | Age: 74
End: 2024-10-30

## 2024-10-30 VITALS
HEIGHT: 60 IN | HEART RATE: 63 BPM | WEIGHT: 156 LBS | BODY MASS INDEX: 30.63 KG/M2 | SYSTOLIC BLOOD PRESSURE: 110 MMHG | DIASTOLIC BLOOD PRESSURE: 62 MMHG

## 2024-10-30 DIAGNOSIS — G47.33 OBSTRUCTIVE SLEEP APNEA: ICD-10-CM

## 2024-10-30 DIAGNOSIS — I10 ESSENTIAL HYPERTENSION: ICD-10-CM

## 2024-10-30 DIAGNOSIS — N28.9 RENAL INSUFFICIENCY: ICD-10-CM

## 2024-10-30 DIAGNOSIS — G40.209 PARTIAL EPILEPSY WITH IMPAIRMENT OF CONSCIOUSNESS (HCC): Primary | ICD-10-CM

## 2024-10-30 RX ORDER — ROSUVASTATIN CALCIUM 40 MG/1
40 TABLET, COATED ORAL EVERY EVENING
COMMUNITY

## 2024-10-30 RX ORDER — PRIMIDONE 250 MG/1
TABLET ORAL
Qty: 180 TABLET | Refills: 1 | Status: SHIPPED | OUTPATIENT
Start: 2024-10-30

## 2024-10-30 NOTE — TELEPHONE ENCOUNTER
Pt phoned wanting to know when he needs to get his blood work done?  He was not sure If he needed it now or right before his appt in April.  Please call pt back.

## 2024-10-30 NOTE — PATIENT INSTRUCTIONS
YOU MUST CONFIRM YOUR APPOINTMENT 1 DAY PRIOR OR IT WILL BE CANCELLED!!   Our office will call you 3 times the day prior to your appointment in an attempt to confirm.  Please return our call ASAP or confirm your appt through [facility] no later than 3 pm the day before your appointment.  If we do not hear back from you by 3 pm to confirm, your appointment will be cancelled & someone will be added into that slot from our wait list.
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
## 2024-11-05 LAB
A/G RATIO: 1.3 (ref 1–2)
ALBUMIN: 4.1 G/DL (ref 3.5–5.7)
ALP BLD-CCNC: 129 U/L (ref 34–104)
ALT SERPL-CCNC: 27 U/L (ref 7–52)
ANION GAP SERPL CALCULATED.3IONS-SCNC: 7 MMOL/L (ref 7–16)
AST SERPL-CCNC: 19 U/L (ref 13–39)
BASOPHILS ABSOLUTE: 0.1 K/UL (ref 0–0.1)
BASOPHILS RELATIVE PERCENT: 0.9 %
BILIRUB SERPL-MCNC: 0.3 MG/DL (ref 0.3–1)
BILIRUBIN DIRECT: <0.05 MG/DL
BUN BLDV-MCNC: 25 MG/DL (ref 7–25)
CALCIUM SERPL-MCNC: 8.9 MG/DL (ref 8.6–10.2)
CHLORIDE BLD-SCNC: 99 MMOL/L (ref 98–107)
CO2: 30 MMOL/L (ref 21–31)
CREAT SERPL-MCNC: 1.49 MG/DL (ref 0.7–1.3)
EGFR MALE: 49 ML/MIN/1.73M2
EOSINOPHILS ABSOLUTE: 0.1 K/UL (ref 0–0.4)
EOSINOPHILS RELATIVE PERCENT: 2.3 %
GLOBULIN: 3.1 G/DL (ref 2.6–4.2)
GLUCOSE BLD-MCNC: 112 MG/DL (ref 74–109)
HCT VFR BLD CALC: 33.2 % (ref 39–51.5)
HEMOGLOBIN: 11.3 G/DL (ref 13.1–17.6)
LYMPHOCYTES ABSOLUTE: 2 K/UL (ref 0.8–3.6)
LYMPHOCYTES RELATIVE PERCENT: 32.5 %
MCH RBC QN AUTO: 32.6 PG (ref 28.4–33.4)
MCHC RBC AUTO-ENTMCNC: 34 G/DL (ref 31.1–37)
MCV RBC AUTO: 95.9 FL (ref 85–99)
MONOCYTES ABSOLUTE: 0.6 K/UL (ref 0.3–0.9)
MONOCYTES RELATIVE PERCENT: 9.9 %
NEUTROPHILS ABSOLUTE: 3.3 K/UL (ref 2–7.3)
NEUTROPHILS RELATIVE PERCENT: 54.4 %
PDW BLD-RTO: 13.4 % (ref 11.7–15.2)
PLATELET # BLD: 195 K/UL (ref 154–393)
POTASSIUM SERPL-SCNC: 4.2 MMOL/L (ref 3.5–5.1)
RBC # BLD: 3.46 M/UL (ref 4.3–5.86)
SODIUM BLD-SCNC: 136 MMOL/L (ref 136–145)
TOTAL PROTEIN: 7.2 G/DL (ref 6–8.3)
WBC # BLD: 6.1 K/UL (ref 4–10.5)

## 2024-11-07 LAB — REPORT: NORMAL

## 2025-03-11 ENCOUNTER — TELEPHONE (OUTPATIENT)
Dept: CARDIOLOGY CLINIC | Age: 75
End: 2025-03-11

## 2025-03-11 NOTE — TELEPHONE ENCOUNTER
Pt came into office wanting to speak to Dkw regarding his medications.   Please call and advise  Thank you

## 2025-03-12 NOTE — TELEPHONE ENCOUNTER
Patient with questions regarding why Tamsulosin was not sent in 90 day supply. Per medication bottle med prescribed by Dr. Perez. Provided patient Dr. Perez office number [phone]. Patient denies further questions regarding medications. Encouraged to call back with any further needs. Verbalized understanding.

## 2025-04-29 ENCOUNTER — OFFICE VISIT (OUTPATIENT)
Dept: NEUROLOGY | Age: 75
End: 2025-04-29
Payer: MEDICARE

## 2025-04-29 VITALS
RESPIRATION RATE: 16 BRPM | DIASTOLIC BLOOD PRESSURE: 76 MMHG | HEIGHT: 60 IN | HEART RATE: 52 BPM | SYSTOLIC BLOOD PRESSURE: 132 MMHG | WEIGHT: 156 LBS | OXYGEN SATURATION: 96 % | BODY MASS INDEX: 30.63 KG/M2

## 2025-04-29 DIAGNOSIS — G40.209 PARTIAL EPILEPSY WITH IMPAIRMENT OF CONSCIOUSNESS (HCC): Primary | ICD-10-CM

## 2025-04-29 DIAGNOSIS — I10 ESSENTIAL HYPERTENSION: ICD-10-CM

## 2025-04-29 PROCEDURE — 3075F SYST BP GE 130 - 139MM HG: CPT | Performed by: PSYCHIATRY & NEUROLOGY

## 2025-04-29 PROCEDURE — 1123F ACP DISCUSS/DSCN MKR DOCD: CPT | Performed by: PSYCHIATRY & NEUROLOGY

## 2025-04-29 PROCEDURE — 3078F DIAST BP <80 MM HG: CPT | Performed by: PSYCHIATRY & NEUROLOGY

## 2025-04-29 PROCEDURE — 1159F MED LIST DOCD IN RCRD: CPT | Performed by: PSYCHIATRY & NEUROLOGY

## 2025-04-29 PROCEDURE — 99213 OFFICE O/P EST LOW 20 MIN: CPT | Performed by: PSYCHIATRY & NEUROLOGY

## 2025-04-29 PROCEDURE — 1160F RVW MEDS BY RX/DR IN RCRD: CPT | Performed by: PSYCHIATRY & NEUROLOGY

## 2025-04-29 RX ORDER — LOSARTAN POTASSIUM 50 MG/1
50 TABLET ORAL DAILY
COMMUNITY

## 2025-04-29 RX ORDER — PRIMIDONE 250 MG/1
TABLET ORAL
Qty: 180 TABLET | Refills: 1 | Status: SHIPPED | OUTPATIENT
Start: 2025-04-29
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
## 2025-04-29 NOTE — PATIENT INSTRUCTIONS

## 2025-05-31 NOTE — PROGRESS NOTES
[facility] HEART [place]  6/3/25  Referring: Dr. Mederos    \"Remy\"    REASON FOR CONSULT/CHIEF COMPLAINT/HPI     Reason for visit/ Chief complaint  Follow up  Coronary calcifications   HPI Remy Giron is a 75 y.o. seen for follow up for coronary calcifications,  hypertension Hyperlipidemia, severe YANICK, partial complex seizure, stroke, renal insuff, anemia, thyroid disorder. He has had injections for right shoulder pain.    He is a retired  ([employer]). He is a nonsmoker, nondrinker. States he has minimal contact with his family members.  He was seen by Dr Matos, advised to continue primidone and cpap.      Today, Mr Giron is doing well  He has two cats who he takes care of.   He gets meals on wheels. He push mows his yard.  He states he is busy running errands, does not take the time to exercise, Does not have any flights of steps to walk up.  No chest pain shortness of breath palpitations or dizziness.  Enjoys watching the [employer]      Patient is adherent with medications and is tolerating them well without side effects     HISTORY/ALLERGIES/ROS     MedHx:  has a past medical history of Cerebral artery occlusion with cerebral infarction (HCC), Hyperlipidemia, Hypothyroidism, Seizures (HCC), and Sleep apnea.  SurgHx:  has a past surgical history that includes Colonoscopy (N/A, 2/14/2023).   SocHx:  reports that he has never smoked. He has been exposed to tobacco smoke. He has never used smokeless tobacco. He reports that he does not drink alcohol and does not use drugs.   FamHx: No family history of premature coronary artery disease, sudden death, or aneurysm  Allergies: Linaclotide   ROS:   Review of Systems   Constitutional:  Negative for activity change, diaphoresis, fatigue and fever.   HENT:  Negative for congestion and ear discharge.    Eyes:  Positive for visual disturbance. Negative for photophobia.   Respiratory:  Negative for cough, chest tightness and shortness

## 2025-06-03 ENCOUNTER — OFFICE VISIT (OUTPATIENT)
Dept: CARDIOLOGY CLINIC | Age: 75
End: 2025-06-03
Payer: MEDICARE

## 2025-06-03 VITALS
DIASTOLIC BLOOD PRESSURE: 72 MMHG | WEIGHT: 169.9 LBS | SYSTOLIC BLOOD PRESSURE: 106 MMHG | BODY MASS INDEX: 33.36 KG/M2 | OXYGEN SATURATION: 96 % | HEIGHT: 60 IN | HEART RATE: 55 BPM

## 2025-06-03 DIAGNOSIS — G47.33 OBSTRUCTIVE SLEEP APNEA SYNDROME: ICD-10-CM

## 2025-06-03 DIAGNOSIS — N28.9 RENAL INSUFFICIENCY: ICD-10-CM

## 2025-06-03 DIAGNOSIS — E78.2 MIXED HYPERLIPIDEMIA: ICD-10-CM

## 2025-06-03 DIAGNOSIS — I10 ESSENTIAL HYPERTENSION: ICD-10-CM

## 2025-06-03 DIAGNOSIS — I25.10 CORONARY ARTERY DISEASE INVOLVING NATIVE CORONARY ARTERY OF NATIVE HEART WITHOUT ANGINA PECTORIS: Primary | ICD-10-CM

## 2025-06-03 PROCEDURE — 1123F ACP DISCUSS/DSCN MKR DOCD: CPT | Performed by: INTERNAL MEDICINE

## 2025-06-03 PROCEDURE — 3078F DIAST BP <80 MM HG: CPT | Performed by: INTERNAL MEDICINE

## 2025-06-03 PROCEDURE — 3074F SYST BP LT 130 MM HG: CPT | Performed by: INTERNAL MEDICINE

## 2025-06-03 PROCEDURE — 1159F MED LIST DOCD IN RCRD: CPT | Performed by: INTERNAL MEDICINE

## 2025-06-03 PROCEDURE — 93000 ELECTROCARDIOGRAM COMPLETE: CPT | Performed by: INTERNAL MEDICINE

## 2025-06-03 PROCEDURE — 99214 OFFICE O/P EST MOD 30 MIN: CPT | Performed by: INTERNAL MEDICINE

## 2025-06-03 RX ORDER — MUPIROCIN CALCIUM 20 MG/G
CREAM TOPICAL 3 TIMES DAILY
COMMUNITY

## 2025-06-03 RX ORDER — ISOSORBIDE MONONITRATE 30 MG/1
15 TABLET, EXTENDED RELEASE ORAL DAILY
Qty: 90 TABLET | Refills: 3 | Status: SHIPPED | OUTPATIENT
Start: 2025-06-03

## 2025-06-03 RX ORDER — IRBESARTAN 150 MG/1
150 TABLET ORAL DAILY
Qty: 90 TABLET | Refills: 3 | Status: SHIPPED | OUTPATIENT
Start: 2025-06-03

## 2025-06-09 ENCOUNTER — TELEPHONE (OUTPATIENT)
Dept: CARDIOLOGY CLINIC | Age: 75
End: 2025-06-09

## 2025-06-09 NOTE — TELEPHONE ENCOUNTER
We have ordered a 90 day supply, he is only supposed to be taking one half of the pill, please clarify with patient, thanks

## 2025-06-09 NOTE — TELEPHONE ENCOUNTER
Pt called stating they receive 24 day supply of the isosorbide mononitrate (IMDUR) 30 MG extended release tablet, instead of the 90 tablets. Pt would like 90 day supply if possible.     Pt would like to know what should do? Will they get the balance.    Pt would like call back

## (undated) DEVICE — SOLUTION IV IRRIG WATER 500ML POUR BRL ST 2F7113

## (undated) DEVICE — AIR/WATER CLEANING ADAPTER FOR OLYMPUS® GI ENDOSCOPE: Brand: BULLDOG®

## (undated) DEVICE — BW-412T DISP COMBO CLEANING BRUSH: Brand: SINGLE USE COMBINATION CLEANING BRUSH

## (undated) DEVICE — ENDOSCOPIC KIT 6X3/16 FT COLON W/ 1.1 OZ 2 GWN W/O BRSH

## (undated) DEVICE — VALVE SUCTION AIR H2O SET ORCA POD + DISP